# Patient Record
Sex: FEMALE | Race: WHITE | NOT HISPANIC OR LATINO | Employment: OTHER | URBAN - METROPOLITAN AREA
[De-identification: names, ages, dates, MRNs, and addresses within clinical notes are randomized per-mention and may not be internally consistent; named-entity substitution may affect disease eponyms.]

---

## 2023-03-02 LAB
ANION GAP IN SER/PLAS: 14 MMOL/L (ref 10–20)
CALCIUM (MG/DL) IN SER/PLAS: 9.9 MG/DL (ref 8.6–10.6)
CARBON DIOXIDE, TOTAL (MMOL/L) IN SER/PLAS: 28 MMOL/L (ref 21–32)
CHLORIDE (MMOL/L) IN SER/PLAS: 104 MMOL/L (ref 98–107)
CHOLESTEROL (MG/DL) IN SER/PLAS: 176 MG/DL (ref 0–199)
CHOLESTEROL IN HDL (MG/DL) IN SER/PLAS: 93.5 MG/DL
CHOLESTEROL/HDL RATIO: 1.9
CREATININE (MG/DL) IN SER/PLAS: 0.66 MG/DL (ref 0.5–1.05)
ESTIMATED AVERAGE GLUCOSE FOR HBA1C: 114 MG/DL
GFR FEMALE: >90 ML/MIN/1.73M2
GLUCOSE (MG/DL) IN SER/PLAS: 102 MG/DL (ref 74–99)
HEMOGLOBIN A1C/HEMOGLOBIN TOTAL IN BLOOD: 5.6 %
LDL: 69 MG/DL (ref 0–99)
POTASSIUM (MMOL/L) IN SER/PLAS: 4.8 MMOL/L (ref 3.5–5.3)
SODIUM (MMOL/L) IN SER/PLAS: 141 MMOL/L (ref 136–145)
TRIGLYCERIDE (MG/DL) IN SER/PLAS: 67 MG/DL (ref 0–149)
UREA NITROGEN (MG/DL) IN SER/PLAS: 19 MG/DL (ref 6–23)
VLDL: 13 MG/DL (ref 0–40)

## 2023-05-17 LAB
CLARITY FLUID: NORMAL
COLOR OF BODY FLUID: NORMAL
ERYTHROCYTES (/UL) IN BODY FLUID: NORMAL /UL
JOINT FLUID CRYSTALS: NORMAL
LEUKOCYTES (/UL) IN BODY FLUID: 106 /UL

## 2023-05-20 LAB
GRAM STAIN: NORMAL
STERILE FLUID CULTURE/SMEAR: NORMAL

## 2023-06-06 ENCOUNTER — APPOINTMENT (OUTPATIENT)
Dept: PRIMARY CARE | Facility: CLINIC | Age: 68
End: 2023-06-06
Payer: COMMERCIAL

## 2023-06-08 ENCOUNTER — OFFICE VISIT (OUTPATIENT)
Dept: PRIMARY CARE | Facility: CLINIC | Age: 68
End: 2023-06-08
Payer: MEDICARE

## 2023-06-08 VITALS
TEMPERATURE: 96.5 F | WEIGHT: 198 LBS | BODY MASS INDEX: 31.08 KG/M2 | OXYGEN SATURATION: 99 % | HEART RATE: 80 BPM | DIASTOLIC BLOOD PRESSURE: 68 MMHG | SYSTOLIC BLOOD PRESSURE: 124 MMHG | HEIGHT: 67 IN

## 2023-06-08 DIAGNOSIS — Z00.00 ROUTINE GENERAL MEDICAL EXAMINATION AT HEALTH CARE FACILITY: Primary | ICD-10-CM

## 2023-06-08 DIAGNOSIS — Z00.00 ROUTINE GENERAL MEDICAL EXAMINATION AT A HEALTH CARE FACILITY: ICD-10-CM

## 2023-06-08 DIAGNOSIS — E11.9 TYPE 2 DIABETES MELLITUS WITHOUT COMPLICATION, UNSPECIFIED WHETHER LONG TERM INSULIN USE (MULTI): ICD-10-CM

## 2023-06-08 DIAGNOSIS — E78.5 HYPERLIPIDEMIA, UNSPECIFIED HYPERLIPIDEMIA TYPE: ICD-10-CM

## 2023-06-08 DIAGNOSIS — I10 HYPERTENSION, UNSPECIFIED TYPE: ICD-10-CM

## 2023-06-08 LAB — HBA1C MFR BLD: 5.7 % (ref 4.2–6.5)

## 2023-06-08 PROCEDURE — 1160F RVW MEDS BY RX/DR IN RCRD: CPT | Performed by: STUDENT IN AN ORGANIZED HEALTH CARE EDUCATION/TRAINING PROGRAM

## 2023-06-08 PROCEDURE — 3074F SYST BP LT 130 MM HG: CPT | Performed by: STUDENT IN AN ORGANIZED HEALTH CARE EDUCATION/TRAINING PROGRAM

## 2023-06-08 PROCEDURE — 4010F ACE/ARB THERAPY RXD/TAKEN: CPT | Performed by: STUDENT IN AN ORGANIZED HEALTH CARE EDUCATION/TRAINING PROGRAM

## 2023-06-08 PROCEDURE — 99213 OFFICE O/P EST LOW 20 MIN: CPT | Performed by: STUDENT IN AN ORGANIZED HEALTH CARE EDUCATION/TRAINING PROGRAM

## 2023-06-08 PROCEDURE — 1036F TOBACCO NON-USER: CPT | Performed by: STUDENT IN AN ORGANIZED HEALTH CARE EDUCATION/TRAINING PROGRAM

## 2023-06-08 PROCEDURE — G0439 PPPS, SUBSEQ VISIT: HCPCS | Performed by: STUDENT IN AN ORGANIZED HEALTH CARE EDUCATION/TRAINING PROGRAM

## 2023-06-08 PROCEDURE — 83036 HEMOGLOBIN GLYCOSYLATED A1C: CPT | Performed by: STUDENT IN AN ORGANIZED HEALTH CARE EDUCATION/TRAINING PROGRAM

## 2023-06-08 PROCEDURE — 1170F FXNL STATUS ASSESSED: CPT | Performed by: STUDENT IN AN ORGANIZED HEALTH CARE EDUCATION/TRAINING PROGRAM

## 2023-06-08 PROCEDURE — 3078F DIAST BP <80 MM HG: CPT | Performed by: STUDENT IN AN ORGANIZED HEALTH CARE EDUCATION/TRAINING PROGRAM

## 2023-06-08 PROCEDURE — 1159F MED LIST DOCD IN RCRD: CPT | Performed by: STUDENT IN AN ORGANIZED HEALTH CARE EDUCATION/TRAINING PROGRAM

## 2023-06-08 PROCEDURE — 3044F HG A1C LEVEL LT 7.0%: CPT | Performed by: STUDENT IN AN ORGANIZED HEALTH CARE EDUCATION/TRAINING PROGRAM

## 2023-06-08 RX ORDER — VIT C/E/ZN/COPPR/LUTEIN/ZEAXAN 250MG-90MG
1 CAPSULE ORAL DAILY
COMMUNITY
End: 2023-10-22

## 2023-06-08 RX ORDER — VIT C/E/ZN/COPPR/LUTEIN/ZEAXAN 250MG-90MG
25 CAPSULE ORAL DAILY
COMMUNITY
End: 2023-10-22

## 2023-06-08 RX ORDER — ATORVASTATIN CALCIUM 20 MG/1
20 TABLET, FILM COATED ORAL DAILY
COMMUNITY
Start: 2023-04-25 | End: 2023-11-21

## 2023-06-08 RX ORDER — LISINOPRIL 20 MG/1
20 TABLET ORAL DAILY
COMMUNITY
End: 2024-01-02 | Stop reason: SDUPTHER

## 2023-06-08 RX ORDER — ASPIRIN 81 MG/1
81 TABLET ORAL DAILY
COMMUNITY

## 2023-06-08 RX ORDER — GLIMEPIRIDE 1 MG/1
1 TABLET ORAL 2 TIMES DAILY
COMMUNITY
End: 2023-08-23

## 2023-06-08 RX ORDER — KETOCONAZOLE 20 MG/G
CREAM TOPICAL
COMMUNITY
Start: 2023-04-24 | End: 2024-05-15 | Stop reason: SDUPTHER

## 2023-06-08 ASSESSMENT — PATIENT HEALTH QUESTIONNAIRE - PHQ9
1. LITTLE INTEREST OR PLEASURE IN DOING THINGS: NOT AT ALL
2. FEELING DOWN, DEPRESSED OR HOPELESS: NOT AT ALL
SUM OF ALL RESPONSES TO PHQ9 QUESTIONS 1 AND 2: 0

## 2023-06-08 ASSESSMENT — ACTIVITIES OF DAILY LIVING (ADL)
GROCERY_SHOPPING: INDEPENDENT
TAKING_MEDICATION: INDEPENDENT
DRESSING: INDEPENDENT
DOING_HOUSEWORK: INDEPENDENT
MANAGING_FINANCES: INDEPENDENT
BATHING: INDEPENDENT

## 2023-06-08 ASSESSMENT — ENCOUNTER SYMPTOMS
LOSS OF SENSATION IN FEET: 0
DEPRESSION: 0
OCCASIONAL FEELINGS OF UNSTEADINESS: 0

## 2023-06-08 NOTE — PROGRESS NOTES
"Subjective   Reason for Visit: Carmen Mendez is an 68 y.o. female here for a Medicare Wellness visit.     HPI    DM: well controlled, lost about 50 lbs was at 12 in past and now in 5.7. only on amaryl, takes 1 mg BID. Has some neuropathy, and eye issues but sees an eye doctor    Htn: stable  on ac no lh or dizziness    Hld: stable      Patient Care Team:  Hardeep Green DO as PCP - General (Internal Medicine)     Review of Systems   All other systems reviewed and are negative.      Objective   Vitals:  /68 (BP Location: Right arm, Patient Position: Sitting)   Pulse 80   Temp 35.8 °C (96.5 °F) (Temporal)   Ht 1.702 m (5' 7\")   Wt 89.8 kg (198 lb)   SpO2 99%   BMI 31.01 kg/m²       Physical Exam  Constitutional:       Appearance: Normal appearance.   HENT:      Head: Normocephalic and atraumatic.      Right Ear: Tympanic membrane and ear canal normal.      Left Ear: Tympanic membrane and ear canal normal.      Mouth/Throat:      Mouth: Mucous membranes are moist.      Pharynx: Oropharynx is clear.   Eyes:      Extraocular Movements: Extraocular movements intact.      Conjunctiva/sclera: Conjunctivae normal.      Pupils: Pupils are equal, round, and reactive to light.   Cardiovascular:      Rate and Rhythm: Normal rate and regular rhythm.      Pulses: Normal pulses.      Heart sounds: Normal heart sounds.   Pulmonary:      Effort: Pulmonary effort is normal.      Breath sounds: Normal breath sounds.   Abdominal:      General: Abdomen is flat. Bowel sounds are normal.      Palpations: Abdomen is soft.   Musculoskeletal:         General: Normal range of motion.      Cervical back: Normal range of motion and neck supple.   Skin:     General: Skin is warm and dry.      Capillary Refill: Capillary refill takes 2 to 3 seconds.   Neurological:      General: No focal deficit present.      Mental Status: She is alert and oriented to person, place, and time. Mental status is at baseline.   Psychiatric:         Mood and " Affect: Mood normal.         Behavior: Behavior normal.         Thought Content: Thought content normal.         Judgment: Judgment normal.         Assessment/Plan   Problem List Items Addressed This Visit    None  Visit Diagnoses       Type 2 diabetes mellitus without complication, unspecified whether long term insulin use (CMS/[facility])        Relevant Orders    POCT Glycosylated Hemoglobin (HGB A1C) docked device            1. Type 2 diabetes mellitus without complication, unspecified whether long term insulin use (CMS/[facility])  - 5.7 doing well reduce amaryl to once a day  - POCT Glycosylated Hemoglobin (HGB A1C) docked device    2. Routine general medical examination at a health care facility  Stable labs reviewed    3. Hypertension, unspecified type  stable    4. Hyperlipidemia, unspecified hyperlipidemia type  Continue statin

## 2023-08-23 DIAGNOSIS — E11.9 TYPE 2 DIABETES MELLITUS WITHOUT COMPLICATION, WITHOUT LONG-TERM CURRENT USE OF INSULIN (MULTI): Primary | ICD-10-CM

## 2023-08-23 RX ORDER — GLIMEPIRIDE 1 MG/1
3 TABLET ORAL DAILY
Qty: 270 TABLET | Refills: 2 | Status: SHIPPED | OUTPATIENT
Start: 2023-08-23 | End: 2023-09-08

## 2023-09-08 ENCOUNTER — OFFICE VISIT (OUTPATIENT)
Dept: PRIMARY CARE | Facility: CLINIC | Age: 68
End: 2023-09-08
Payer: MEDICARE

## 2023-09-08 VITALS
HEART RATE: 76 BPM | WEIGHT: 200 LBS | OXYGEN SATURATION: 98 % | DIASTOLIC BLOOD PRESSURE: 70 MMHG | BODY MASS INDEX: 31.32 KG/M2 | SYSTOLIC BLOOD PRESSURE: 124 MMHG

## 2023-09-08 DIAGNOSIS — Z12.31 ENCOUNTER FOR SCREENING MAMMOGRAM FOR MALIGNANT NEOPLASM OF BREAST: ICD-10-CM

## 2023-09-08 DIAGNOSIS — E11.9 TYPE 2 DIABETES MELLITUS WITHOUT COMPLICATION, UNSPECIFIED WHETHER LONG TERM INSULIN USE (MULTI): ICD-10-CM

## 2023-09-08 DIAGNOSIS — Z12.39 BREAST SCREENING: Primary | ICD-10-CM

## 2023-09-08 LAB — HBA1C MFR BLD: 6 % (ref 4.2–6.5)

## 2023-09-08 PROCEDURE — 1160F RVW MEDS BY RX/DR IN RCRD: CPT | Performed by: STUDENT IN AN ORGANIZED HEALTH CARE EDUCATION/TRAINING PROGRAM

## 2023-09-08 PROCEDURE — 83036 HEMOGLOBIN GLYCOSYLATED A1C: CPT | Mod: CLIA WAIVED TEST | Performed by: STUDENT IN AN ORGANIZED HEALTH CARE EDUCATION/TRAINING PROGRAM

## 2023-09-08 PROCEDURE — 3078F DIAST BP <80 MM HG: CPT | Performed by: STUDENT IN AN ORGANIZED HEALTH CARE EDUCATION/TRAINING PROGRAM

## 2023-09-08 PROCEDURE — 3044F HG A1C LEVEL LT 7.0%: CPT | Performed by: STUDENT IN AN ORGANIZED HEALTH CARE EDUCATION/TRAINING PROGRAM

## 2023-09-08 PROCEDURE — 1036F TOBACCO NON-USER: CPT | Performed by: STUDENT IN AN ORGANIZED HEALTH CARE EDUCATION/TRAINING PROGRAM

## 2023-09-08 PROCEDURE — 4010F ACE/ARB THERAPY RXD/TAKEN: CPT | Performed by: STUDENT IN AN ORGANIZED HEALTH CARE EDUCATION/TRAINING PROGRAM

## 2023-09-08 PROCEDURE — 99214 OFFICE O/P EST MOD 30 MIN: CPT | Performed by: STUDENT IN AN ORGANIZED HEALTH CARE EDUCATION/TRAINING PROGRAM

## 2023-09-08 PROCEDURE — 3074F SYST BP LT 130 MM HG: CPT | Performed by: STUDENT IN AN ORGANIZED HEALTH CARE EDUCATION/TRAINING PROGRAM

## 2023-09-08 PROCEDURE — 1159F MED LIST DOCD IN RCRD: CPT | Performed by: STUDENT IN AN ORGANIZED HEALTH CARE EDUCATION/TRAINING PROGRAM

## 2023-09-08 ASSESSMENT — ENCOUNTER SYMPTOMS: DEPRESSION: 0

## 2023-09-08 NOTE — PROGRESS NOTES
Subjective   Patient ID: Carmen Mendez is a 68 y.o. female who presents for Follow-up (A1C check and was in er for chest pain.).    HPI     Patient was in the ER for chest pain.  Does follow with Dr. Carreno.  She has scheduled for stress test.  Her pain is gone resolved.    A1c check.  Patient's been on 1 mg of Amaryl.  Patient's A1c today was 6.0.  Went up by three-point at the reduced dose.  Stop her and altogether.  She will follow-up in 3 months to follow-up with this.    She is a very knee condition with synovial tumors.  This can cause traumatic fractures of her knee.  She does follow with orthopedic surgery.  She will need to have definitive surgery at the time.  But she is not ready for at this time.  She was advised at this time.    Review of Systems   All other systems reviewed and are negative.      Objective   /70 (BP Location: Right arm, Patient Position: Sitting, BP Cuff Size: Adult)   Pulse 76   Wt 90.7 kg (200 lb)   SpO2 98%   BMI 31.32 kg/m²     Physical Exam  Constitutional:       Appearance: Normal appearance.   HENT:      Head: Normocephalic and atraumatic.      Right Ear: Tympanic membrane and ear canal normal.      Left Ear: Tympanic membrane and ear canal normal.      Mouth/Throat:      Mouth: Mucous membranes are moist.      Pharynx: Oropharynx is clear.   Eyes:      Extraocular Movements: Extraocular movements intact.      Conjunctiva/sclera: Conjunctivae normal.      Pupils: Pupils are equal, round, and reactive to light.   Cardiovascular:      Rate and Rhythm: Normal rate and regular rhythm.      Pulses: Normal pulses.      Heart sounds: Normal heart sounds.   Pulmonary:      Effort: Pulmonary effort is normal.      Breath sounds: Normal breath sounds.   Abdominal:      General: Abdomen is flat. Bowel sounds are normal.      Palpations: Abdomen is soft.   Musculoskeletal:         General: Normal range of motion.      Cervical back: Normal range of motion and neck supple.   Skin:      General: Skin is warm and dry.      Capillary Refill: Capillary refill takes 2 to 3 seconds.   Neurological:      General: No focal deficit present.      Mental Status: She is alert and oriented to person, place, and time. Mental status is at baseline.   Psychiatric:         Mood and Affect: Mood normal.         Behavior: Behavior normal.         Thought Content: Thought content normal.         Judgment: Judgment normal.         Assessment/Plan   1. Type 2 diabetes mellitus without complication, unspecified whether long term insulin use (CMS/[facility])  A1c 6.0 we will stop her Amaryl per patient request.  We will follow her on her diet controlled diabetes.  She will follow-up in 3 months.  - POCT Glycosylated Hemoglobin (HGB A1C) docked device      Chest pain.  Her ER visit was reviewed.  Stable and stress test.     Vies get her knee surgery done.

## 2023-09-25 PROBLEM — R07.89 CHEST PAIN, MIDSTERNAL: Status: ACTIVE | Noted: 2023-09-25

## 2023-09-25 PROBLEM — H35.3220 EXUDATIVE AGE-RELATED MACULAR DEGENERATION OF LEFT EYE (MULTI): Status: ACTIVE | Noted: 2023-09-25

## 2023-09-25 PROBLEM — I25.10 CORONARY ARTERY DISEASE: Status: ACTIVE | Noted: 2023-09-25

## 2023-09-25 PROBLEM — Z85.828 PERSONAL HISTORY OF OTHER MALIGNANT NEOPLASM OF SKIN: Status: ACTIVE | Noted: 2023-02-23

## 2023-09-25 PROBLEM — E11.42 PERIPHERAL SENSORY NEUROPATHY DUE TO TYPE 2 DIABETES MELLITUS (MULTI): Status: ACTIVE | Noted: 2023-09-25

## 2023-09-25 PROBLEM — R91.1 LUNG NODULE: Status: ACTIVE | Noted: 2023-09-25

## 2023-09-25 PROBLEM — M12.812 ROTATOR CUFF ARTHROPATHY OF LEFT SHOULDER: Status: ACTIVE | Noted: 2023-07-03

## 2023-09-25 PROBLEM — L21.9 SEBORRHEIC DERMATITIS, UNSPECIFIED: Status: ACTIVE | Noted: 2023-02-23

## 2023-09-25 PROBLEM — K64.4 HEMORRHOIDS, EXTERNAL: Status: ACTIVE | Noted: 2023-09-25

## 2023-09-25 PROBLEM — D18.01 HEMANGIOMA OF SKIN AND SUBCUTANEOUS TISSUE: Status: ACTIVE | Noted: 2023-02-23

## 2023-09-25 PROBLEM — E28.39 MENOPAUSE OVARIAN FAILURE: Status: ACTIVE | Noted: 2023-09-25

## 2023-09-25 PROBLEM — D23.61 OTHER BENIGN NEOPLASM OF SKIN OF RIGHT UPPER LIMB, INCLUDING SHOULDER: Status: ACTIVE | Noted: 2023-02-23

## 2023-09-25 PROBLEM — L65.0 TELOGEN EFFLUVIUM: Status: ACTIVE | Noted: 2023-02-23

## 2023-09-25 PROBLEM — L65.9 NONSCARRING HAIR LOSS, UNSPECIFIED: Status: ACTIVE | Noted: 2023-02-23

## 2023-09-25 PROBLEM — G56.22 CUBITAL TUNNEL SYNDROME ON LEFT: Status: ACTIVE | Noted: 2023-09-25

## 2023-09-25 PROBLEM — D49.2 NEOPLASM OF UNSPECIFIED BEHAVIOR OF BONE, SOFT TISSUE, AND SKIN: Status: ACTIVE | Noted: 2023-02-23

## 2023-09-25 PROBLEM — E11.40 TYPE 2 DIABETES MELLITUS WITH DIABETIC NEUROPATHY, UNSPECIFIED (MULTI): Status: ACTIVE | Noted: 2019-09-30

## 2023-09-25 PROBLEM — E08.3299 BACKGROUND DIABETIC RETINOPATHY ASSOCIATED WITH DIABETES MELLITUS DUE TO UNDERLYING CONDITION (MULTI): Status: ACTIVE | Noted: 2023-09-25

## 2023-09-25 PROBLEM — L57.0 SOLAR KERATOSIS: Status: ACTIVE | Noted: 2023-02-23

## 2023-09-25 PROBLEM — E78.2 MIXED HYPERLIPIDEMIA: Status: ACTIVE | Noted: 2023-06-08

## 2023-09-25 PROBLEM — G57.90 NEUROPATHY OF FOOT: Status: ACTIVE | Noted: 2023-09-25

## 2023-09-25 PROBLEM — M17.9 OSTEOARTHRITIS OF KNEE: Status: ACTIVE | Noted: 2023-09-25

## 2023-09-25 PROBLEM — L81.4 OTHER MELANIN HYPERPIGMENTATION: Status: ACTIVE | Noted: 2023-02-23

## 2023-09-25 PROBLEM — M65.312 TRIGGER THUMB OF LEFT HAND: Status: ACTIVE | Noted: 2023-09-25

## 2023-09-25 PROBLEM — M25.622 DECREASED RANGE OF MOTION OF LEFT ELBOW: Status: ACTIVE | Noted: 2023-09-25

## 2023-09-25 PROBLEM — M25.612 DECREASED RANGE OF MOTION OF LEFT SHOULDER: Status: ACTIVE | Noted: 2023-09-25

## 2023-09-25 PROBLEM — L85.3 XEROSIS CUTIS: Status: ACTIVE | Noted: 2023-02-23

## 2023-09-25 PROBLEM — M75.02 ADHESIVE CAPSULITIS OF LEFT SHOULDER: Status: ACTIVE | Noted: 2023-07-03

## 2023-09-25 PROBLEM — D22.5 MELANOCYTIC NEVI OF TRUNK: Status: ACTIVE | Noted: 2023-02-23

## 2023-09-25 PROBLEM — G56.02 LEFT CARPAL TUNNEL SYNDROME: Status: ACTIVE | Noted: 2023-09-25

## 2023-09-25 PROBLEM — D22.9 MULTIPLE NEVI: Status: ACTIVE | Noted: 2023-09-25

## 2023-09-26 ENCOUNTER — APPOINTMENT (OUTPATIENT)
Dept: PRIMARY CARE | Facility: CLINIC | Age: 68
End: 2023-09-26
Payer: MEDICARE

## 2023-09-26 DIAGNOSIS — Z23 NEED FOR VACCINATION: ICD-10-CM

## 2023-10-06 ENCOUNTER — OFFICE VISIT (OUTPATIENT)
Dept: PRIMARY CARE | Facility: CLINIC | Age: 68
End: 2023-10-06
Payer: MEDICARE

## 2023-10-06 VITALS
HEART RATE: 92 BPM | WEIGHT: 200 LBS | HEIGHT: 67 IN | SYSTOLIC BLOOD PRESSURE: 130 MMHG | DIASTOLIC BLOOD PRESSURE: 78 MMHG | BODY MASS INDEX: 31.39 KG/M2 | OXYGEN SATURATION: 99 %

## 2023-10-06 DIAGNOSIS — T80.90XS INJECTION SITE REACTION, SEQUELA: Primary | ICD-10-CM

## 2023-10-06 PROBLEM — T80.90XA INJECTION SITE REACTION: Status: ACTIVE | Noted: 2023-10-06

## 2023-10-06 PROCEDURE — 99213 OFFICE O/P EST LOW 20 MIN: CPT | Performed by: STUDENT IN AN ORGANIZED HEALTH CARE EDUCATION/TRAINING PROGRAM

## 2023-10-06 PROCEDURE — 1159F MED LIST DOCD IN RCRD: CPT | Performed by: STUDENT IN AN ORGANIZED HEALTH CARE EDUCATION/TRAINING PROGRAM

## 2023-10-06 PROCEDURE — 3075F SYST BP GE 130 - 139MM HG: CPT | Performed by: STUDENT IN AN ORGANIZED HEALTH CARE EDUCATION/TRAINING PROGRAM

## 2023-10-06 PROCEDURE — 3044F HG A1C LEVEL LT 7.0%: CPT | Performed by: STUDENT IN AN ORGANIZED HEALTH CARE EDUCATION/TRAINING PROGRAM

## 2023-10-06 PROCEDURE — 1160F RVW MEDS BY RX/DR IN RCRD: CPT | Performed by: STUDENT IN AN ORGANIZED HEALTH CARE EDUCATION/TRAINING PROGRAM

## 2023-10-06 PROCEDURE — 3078F DIAST BP <80 MM HG: CPT | Performed by: STUDENT IN AN ORGANIZED HEALTH CARE EDUCATION/TRAINING PROGRAM

## 2023-10-06 PROCEDURE — 1036F TOBACCO NON-USER: CPT | Performed by: STUDENT IN AN ORGANIZED HEALTH CARE EDUCATION/TRAINING PROGRAM

## 2023-10-06 PROCEDURE — 4010F ACE/ARB THERAPY RXD/TAKEN: CPT | Performed by: STUDENT IN AN ORGANIZED HEALTH CARE EDUCATION/TRAINING PROGRAM

## 2023-10-06 NOTE — PROGRESS NOTES
"Subjective   Patient ID: Carmen Mendez is a 68 y.o. female who presents for Allergic Reaction (Pain and difficulty moving the right arm, warmth and redness above the vaccine site).    HPI     Flu shot last week startd to have arm pain near injection site yesterday, hurt to move around, getting warren,r no redeness, swellign or mass noted    Review of Systems   All other systems reviewed and are negative.      Objective   /78 (BP Location: Left arm, Patient Position: Sitting)   Pulse 92   Ht 1.702 m (5' 7\")   Wt 90.7 kg (200 lb)   SpO2 99%   BMI 31.32 kg/m²     Physical Exam  Constitutional:       Appearance: Normal appearance.   HENT:      Head: Normocephalic and atraumatic.      Right Ear: Tympanic membrane and ear canal normal.      Left Ear: Tympanic membrane and ear canal normal.      Mouth/Throat:      Mouth: Mucous membranes are moist.      Pharynx: Oropharynx is clear.   Eyes:      Extraocular Movements: Extraocular movements intact.      Conjunctiva/sclera: Conjunctivae normal.      Pupils: Pupils are equal, round, and reactive to light.   Cardiovascular:      Rate and Rhythm: Normal rate and regular rhythm.      Pulses: Normal pulses.      Heart sounds: Normal heart sounds.   Pulmonary:      Effort: Pulmonary effort is normal.      Breath sounds: Normal breath sounds.   Abdominal:      General: Abdomen is flat. Bowel sounds are normal.      Palpations: Abdomen is soft.   Musculoskeletal:         General: Normal range of motion.      Cervical back: Normal range of motion and neck supple.   Skin:     General: Skin is warm and dry.      Capillary Refill: Capillary refill takes 2 to 3 seconds.   Neurological:      General: No focal deficit present.      Mental Status: She is alert and oriented to person, place, and time. Mental status is at baseline.   Psychiatric:         Mood and Affect: Mood normal.         Behavior: Behavior normal.         Thought Content: Thought content normal.         " Judgment: Judgment normal.         Assessment/Plan   1. Injection site reaction, sequela  Reassurance  No redness mass, abscess or hematoma noted

## 2023-10-22 PROBLEM — Z87.891 PERSONAL HISTORY OF NICOTINE DEPENDENCE: Status: ACTIVE | Noted: 2021-08-25

## 2023-10-22 PROBLEM — M62.81 MUSCLE WEAKNESS (GENERALIZED): Status: ACTIVE | Noted: 2019-10-01

## 2023-10-22 PROBLEM — Z96.653 PRESENCE OF ARTIFICIAL KNEE JOINT, BILATERAL: Status: ACTIVE | Noted: 2019-09-30

## 2023-10-22 PROBLEM — I10 ESSENTIAL (PRIMARY) HYPERTENSION: Status: ACTIVE | Noted: 2019-09-30

## 2023-10-22 PROBLEM — H40.9 UNSPECIFIED GLAUCOMA: Status: ACTIVE | Noted: 2019-09-30

## 2023-10-22 PROBLEM — Z96.612 PRESENCE OF LEFT ARTIFICIAL SHOULDER JOINT: Status: ACTIVE | Noted: 2019-09-30

## 2023-10-24 ENCOUNTER — ANCILLARY PROCEDURE (OUTPATIENT)
Dept: RADIOLOGY | Facility: CLINIC | Age: 68
End: 2023-10-24
Payer: MEDICARE

## 2023-10-24 ENCOUNTER — HOSPITAL ENCOUNTER (OUTPATIENT)
Dept: CARDIOLOGY | Facility: CLINIC | Age: 68
Discharge: HOME | End: 2023-10-24
Payer: MEDICARE

## 2023-10-24 ENCOUNTER — OFFICE VISIT (OUTPATIENT)
Dept: CARDIOLOGY | Facility: CLINIC | Age: 68
End: 2023-10-24
Payer: MEDICARE

## 2023-10-24 VITALS
SYSTOLIC BLOOD PRESSURE: 122 MMHG | HEART RATE: 66 BPM | HEIGHT: 67 IN | BODY MASS INDEX: 31.86 KG/M2 | DIASTOLIC BLOOD PRESSURE: 76 MMHG | WEIGHT: 203 LBS

## 2023-10-24 DIAGNOSIS — E78.2 MIXED HYPERLIPIDEMIA: ICD-10-CM

## 2023-10-24 DIAGNOSIS — I25.10 ATHEROSCLEROTIC HEART DISEASE OF NATIVE CORONARY ARTERY WITHOUT ANGINA PECTORIS: ICD-10-CM

## 2023-10-24 DIAGNOSIS — I25.119 CORONARY ARTERY DISEASE INVOLVING NATIVE CORONARY ARTERY OF NATIVE HEART WITH ANGINA PECTORIS (CMS-HCC): ICD-10-CM

## 2023-10-24 DIAGNOSIS — R07.89 OTHER CHEST PAIN: Primary | ICD-10-CM

## 2023-10-24 DIAGNOSIS — I10 ESSENTIAL (PRIMARY) HYPERTENSION: ICD-10-CM

## 2023-10-24 DIAGNOSIS — R07.9 CHEST PAIN, UNSPECIFIED: ICD-10-CM

## 2023-10-24 DIAGNOSIS — I25.10 CAD (CORONARY ARTERY DISEASE): Primary | ICD-10-CM

## 2023-10-24 DIAGNOSIS — R07.89 OTHER CHEST PAIN: ICD-10-CM

## 2023-10-24 DIAGNOSIS — R07.89 CHEST PAIN, MIDSTERNAL: Primary | ICD-10-CM

## 2023-10-24 PROCEDURE — 1159F MED LIST DOCD IN RCRD: CPT | Performed by: INTERNAL MEDICINE

## 2023-10-24 PROCEDURE — 3044F HG A1C LEVEL LT 7.0%: CPT | Performed by: INTERNAL MEDICINE

## 2023-10-24 PROCEDURE — 93017 CV STRESS TEST TRACING ONLY: CPT

## 2023-10-24 PROCEDURE — 78451 HT MUSCLE IMAGE SPECT SING: CPT

## 2023-10-24 PROCEDURE — 3074F SYST BP LT 130 MM HG: CPT | Performed by: INTERNAL MEDICINE

## 2023-10-24 PROCEDURE — 1160F RVW MEDS BY RX/DR IN RCRD: CPT | Performed by: INTERNAL MEDICINE

## 2023-10-24 PROCEDURE — 4010F ACE/ARB THERAPY RXD/TAKEN: CPT | Performed by: INTERNAL MEDICINE

## 2023-10-24 PROCEDURE — 78452 HT MUSCLE IMAGE SPECT MULT: CPT | Performed by: INTERNAL MEDICINE

## 2023-10-24 PROCEDURE — 93016 CV STRESS TEST SUPVJ ONLY: CPT | Performed by: INTERNAL MEDICINE

## 2023-10-24 PROCEDURE — 99214 OFFICE O/P EST MOD 30 MIN: CPT | Performed by: INTERNAL MEDICINE

## 2023-10-24 PROCEDURE — 3078F DIAST BP <80 MM HG: CPT | Performed by: INTERNAL MEDICINE

## 2023-10-24 PROCEDURE — 1036F TOBACCO NON-USER: CPT | Performed by: INTERNAL MEDICINE

## 2023-10-24 RX ORDER — GLIMEPIRIDE 1 MG/1
1 TABLET ORAL
COMMUNITY
End: 2024-05-28 | Stop reason: SDUPTHER

## 2023-10-24 RX ORDER — AMOXICILLIN 500 MG/1
500 TABLET, FILM COATED ORAL
COMMUNITY
Start: 2023-10-02 | End: 2023-10-24 | Stop reason: ALTCHOICE

## 2023-10-24 NOTE — PROGRESS NOTES
Subjective   Carmen Mendez is a 68 y.o. female.    Chief Complaint:  Chest pain    HPI    The patient was seen in the emergency room in 2023 when she presented with chest pain.  She described midsternal chest pain described as a sharp rather severe discomfort.  This lasted for hours.  She presented to the emergency room.  Her troponins were negative.  Her EKGs showed no acute changes.  She was discharged for outpatient follow-up.  She is here for follow-up of her stress testing and thallium imaging studies.  She has had no further episodes of chest discomfort or chest pressure since discharge.  Otherwise since her last visit she has felt well and has had no other problems.  No problems with her medications.    Her diagnosis of coronary artery disease is based on calcifications in the mid left anterior descending coronary artery seen on a prior CT scan of the chest. There is moderate atherosclerosis present.    Her cardiac history is significant for hypertension. Risk factors for coronary artery disease include hypertension, 27 years of diabetes, a 10-pack-year smoking history, positive family history of vascular disease, and a history of hyperlipidemia.    Past surgical history consistent with a history of bilateral total knee replacements,  section, carpal tunnel, and cataract surgeries. Also has had left arm surgery and right shoulder surgery.    She is  with 3 children. 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     Review of Systems   Cardiovascular:  Positive for chest pain.   All other systems reviewed and are negative.      Objective   Constitutional:       Appearance: Not in distress.   Neck:      Vascular: JVD normal.   Pulmonary:      Breath sounds: Normal breath sounds.   Cardiovascular:      Normal rate. Regular rhythm. Normal S1. Normal S2.       Murmurs: There is no murmur.      No gallop.    Pulses:     Intact distal pulses.   Edema:     Peripheral edema  "absent.   Abdominal:      General: There is no distension.      Palpations: Abdomen is soft.   Neurological:      Mental Status: Alert.         Visit Vitals  /76   Pulse 66   Ht 1.702 m (5' 7\")   Wt 92.1 kg (203 lb)   BMI 31.79 kg/m²   OB Status Postmenopausal   Smoking Status Former   BSA 2.09 m²        Lab Review:   Lab Results   Component Value Date     08/02/2023    K 4.3 08/02/2023     08/02/2023    CO2 24 08/02/2023    BUN 16 08/02/2023    CREATININE 0.68 08/02/2023    GLUCOSE 147 (H) 08/02/2023    CALCIUM 9.7 08/02/2023       Assessment:    1.  Coronary artery disease.  Today's stress thallium study shows no ischemia with normal left ventricular function.  We personally reviewed the nuclear stress imaging studies.  The EKGs demonstrate no acute changes.  Because the circumflex and right coronary arteries have no significant atherosclerosis and the atherosclerosis is limited to the left anterior descending coronary artery, this is going to be a very accurate study.  At this point we can therefore continue medical management.    2.  Hypertension.  Blood pressures are normal.    3.  Hyperlipidemia.  Cholesterol 176, HDL 93, LDL 69.  "

## 2023-11-13 ENCOUNTER — LAB (OUTPATIENT)
Dept: LAB | Facility: LAB | Age: 68
End: 2023-11-13
Payer: MEDICARE

## 2023-11-13 DIAGNOSIS — M25.562 PAIN IN LEFT KNEE: Primary | ICD-10-CM

## 2023-11-13 LAB
BASOPHILS # BLD AUTO: 0.02 X10*3/UL (ref 0–0.1)
BASOPHILS NFR BLD AUTO: 0.3 %
CRP SERPL-MCNC: 0.11 MG/DL
EOSINOPHIL # BLD AUTO: 0.11 X10*3/UL (ref 0–0.7)
EOSINOPHIL NFR BLD AUTO: 1.6 %
ERYTHROCYTE [DISTWIDTH] IN BLOOD BY AUTOMATED COUNT: 13.4 % (ref 11.5–14.5)
ERYTHROCYTE [SEDIMENTATION RATE] IN BLOOD BY WESTERGREN METHOD: 12 MM/H (ref 0–30)
HCT VFR BLD AUTO: 44.5 % (ref 36–46)
HGB BLD-MCNC: 14.8 G/DL (ref 12–16)
IMM GRANULOCYTES # BLD AUTO: 0.01 X10*3/UL (ref 0–0.7)
IMM GRANULOCYTES NFR BLD AUTO: 0.1 % (ref 0–0.9)
LYMPHOCYTES # BLD AUTO: 1.87 X10*3/UL (ref 1.2–4.8)
LYMPHOCYTES NFR BLD AUTO: 26.6 %
MCH RBC QN AUTO: 29.8 PG (ref 26–34)
MCHC RBC AUTO-ENTMCNC: 33.3 G/DL (ref 32–36)
MCV RBC AUTO: 90 FL (ref 80–100)
MONOCYTES # BLD AUTO: 0.38 X10*3/UL (ref 0.1–1)
MONOCYTES NFR BLD AUTO: 5.4 %
NEUTROPHILS # BLD AUTO: 4.63 X10*3/UL (ref 1.2–7.7)
NEUTROPHILS NFR BLD AUTO: 66 %
NRBC BLD-RTO: 0 /100 WBCS (ref 0–0)
PLATELET # BLD AUTO: 262 X10*3/UL (ref 150–450)
RBC # BLD AUTO: 4.96 X10*6/UL (ref 4–5.2)
WBC # BLD AUTO: 7 X10*3/UL (ref 4.4–11.3)

## 2023-11-13 PROCEDURE — 36415 COLL VENOUS BLD VENIPUNCTURE: CPT

## 2023-11-13 PROCEDURE — 86140 C-REACTIVE PROTEIN: CPT

## 2023-11-13 PROCEDURE — 85025 COMPLETE CBC W/AUTO DIFF WBC: CPT

## 2023-11-13 PROCEDURE — 85652 RBC SED RATE AUTOMATED: CPT

## 2023-11-14 ENCOUNTER — APPOINTMENT (OUTPATIENT)
Dept: PRIMARY CARE | Facility: CLINIC | Age: 68
End: 2023-11-14
Payer: MEDICARE

## 2023-11-19 DIAGNOSIS — E78.2 MIXED HYPERLIPIDEMIA: ICD-10-CM

## 2023-11-20 ENCOUNTER — OFFICE VISIT (OUTPATIENT)
Dept: OTOLARYNGOLOGY | Facility: CLINIC | Age: 68
End: 2023-11-20
Payer: MEDICARE

## 2023-11-20 VITALS
DIASTOLIC BLOOD PRESSURE: 77 MMHG | WEIGHT: 199 LBS | HEIGHT: 67 IN | SYSTOLIC BLOOD PRESSURE: 125 MMHG | HEART RATE: 66 BPM | BODY MASS INDEX: 31.23 KG/M2 | TEMPERATURE: 97.3 F

## 2023-11-20 DIAGNOSIS — H90.3 ASYMMETRIC SNHL (SENSORINEURAL HEARING LOSS): ICD-10-CM

## 2023-11-20 DIAGNOSIS — R04.0 EPISTAXIS: Primary | ICD-10-CM

## 2023-11-20 DIAGNOSIS — J34.2 NASAL SEPTAL DEVIATION: ICD-10-CM

## 2023-11-20 PROCEDURE — 3078F DIAST BP <80 MM HG: CPT | Performed by: STUDENT IN AN ORGANIZED HEALTH CARE EDUCATION/TRAINING PROGRAM

## 2023-11-20 PROCEDURE — 99212 OFFICE O/P EST SF 10 MIN: CPT | Performed by: STUDENT IN AN ORGANIZED HEALTH CARE EDUCATION/TRAINING PROGRAM

## 2023-11-20 PROCEDURE — 4010F ACE/ARB THERAPY RXD/TAKEN: CPT | Performed by: STUDENT IN AN ORGANIZED HEALTH CARE EDUCATION/TRAINING PROGRAM

## 2023-11-20 PROCEDURE — 1160F RVW MEDS BY RX/DR IN RCRD: CPT | Performed by: STUDENT IN AN ORGANIZED HEALTH CARE EDUCATION/TRAINING PROGRAM

## 2023-11-20 PROCEDURE — 1159F MED LIST DOCD IN RCRD: CPT | Performed by: STUDENT IN AN ORGANIZED HEALTH CARE EDUCATION/TRAINING PROGRAM

## 2023-11-20 PROCEDURE — 3044F HG A1C LEVEL LT 7.0%: CPT | Performed by: STUDENT IN AN ORGANIZED HEALTH CARE EDUCATION/TRAINING PROGRAM

## 2023-11-20 PROCEDURE — 3074F SYST BP LT 130 MM HG: CPT | Performed by: STUDENT IN AN ORGANIZED HEALTH CARE EDUCATION/TRAINING PROGRAM

## 2023-11-20 PROCEDURE — 1126F AMNT PAIN NOTED NONE PRSNT: CPT | Performed by: STUDENT IN AN ORGANIZED HEALTH CARE EDUCATION/TRAINING PROGRAM

## 2023-11-20 PROCEDURE — 99202 OFFICE O/P NEW SF 15 MIN: CPT | Performed by: STUDENT IN AN ORGANIZED HEALTH CARE EDUCATION/TRAINING PROGRAM

## 2023-11-20 PROCEDURE — 1036F TOBACCO NON-USER: CPT | Performed by: STUDENT IN AN ORGANIZED HEALTH CARE EDUCATION/TRAINING PROGRAM

## 2023-11-20 ASSESSMENT — ENCOUNTER SYMPTOMS
LOSS OF SENSATION IN FEET: 0
OCCASIONAL FEELINGS OF UNSTEADINESS: 0
DEPRESSION: 0

## 2023-11-20 ASSESSMENT — PAIN SCALES - GENERAL: PAINLEVEL: 0-NO PAIN

## 2023-11-20 ASSESSMENT — COLUMBIA-SUICIDE SEVERITY RATING SCALE - C-SSRS
2. HAVE YOU ACTUALLY HAD ANY THOUGHTS OF KILLING YOURSELF?: NO
6. HAVE YOU EVER DONE ANYTHING, STARTED TO DO ANYTHING, OR PREPARED TO DO ANYTHING TO END YOUR LIFE?: NO
1. IN THE PAST MONTH, HAVE YOU WISHED YOU WERE DEAD OR WISHED YOU COULD GO TO SLEEP AND NOT WAKE UP?: NO

## 2023-11-20 NOTE — PROGRESS NOTES
SUBJECTIVE  Patient ID: Carmen Mendez is a 68 y.o. female who presents for Epistaxis (Nose Bleed) (Was having nose bleeds - early Nov.).    The patient reports that she had daily episodes of right-sided epistaxis, 11/6-9. Episodes would last a few minutes and would respond to pressure. Takes a daily ASA and a multivitamin for wet macular degeneration. Has not been an issue since she made the appointment. Has not been an issue before. No history of nasal/sinus surgery/trauma. Did have adenoidectomy. No nasal obstruction.    She incidentally notes a history of prior left-sided sudden sensorineural hearing loss which is resulted in asymmetric sensorineural hearing loss.  This occurred as a child and has been stable throughout adulthood.    Review of Systems  Complete ROS negative except as noted above or on patient intake form and as above.    OBJECTIVE  Physical Exam  CONSTITUTIONAL: Well appearing female who appears stated age.  PSYCHIATRIC: Alert, appropriate mood and affect.  RESPIRATORY: Normal inspiration and expiration and chest wall expansion; no use of accessory muscles to breathe.  VOICE: Clear speech without hoarseness. No stridor nor stertor.  HEAD, FACE, AND SKIN: Symmetric facial features.  EARS: External ears were normally formed with no lesions. The external auditory canals were clear. The tympanic membranes were intact and in the neutral position. No significant retraction pockets, effusions, nor hemotympanum were appreciated.  NOSE: Nasal dorsum was midline. Anterior rhinoscopy demonstrated a septum deviated to the left with prominent spurring at the middle meatus.  There is dry nasal mucosa along the anterior septum on the right.  Some small superficial vessels noted along the septum bilaterally.  No obvious nasal masses, polyps, mucopurulence, nor other lesions were appreciated.  OROPHARYNX: No lesion nor mucosal abnormality. The uvula was normal appearing.  Tonsils are surgically absent.  No blood  in the oropharynx.    ASSESSMENT/PLAN  Diagnoses and all orders for this visit:  Epistaxis  Nasal septal deviation  Asymmetric SNHL (sensorineural hearing loss)    68 y.o. female presenting with acute right-sided epistaxis in the setting of aspirin use.    1.  Right-sided epistaxis  The patient is presenting with daily episodes of epistaxis that began suddenly and then resolved. We discussed the patient's physical exam findings and I offered reassurance.  The patient has evidence of slightly dry nasal mucosa along the right anterior septum.  She has left nasal septal deviation.  Given that her symptoms have resolved and her relatively benign exam at this time I recommended observation.  We discussed that dry air was likely contributing factor to her symptoms.    She can follow-up with me as needed.    2.  Asymmetric sensorineural hearing loss  The patient reports longstanding history of left-sided asymmetric sensorineural hearing loss.  She reports that this occurred as a child after an ear infection.  She has not had further issues.  She can return as needed for this indication as it is already been worked up.    This note was created using speech recognition transcription software. Despite proofreading, typographical errors may be present that affect the meaning of the content. Please contact my office with any questions.

## 2023-11-21 RX ORDER — ATORVASTATIN CALCIUM 20 MG/1
20 TABLET, FILM COATED ORAL DAILY
Qty: 90 TABLET | Refills: 3 | Status: SHIPPED | OUTPATIENT
Start: 2023-11-21

## 2023-12-11 ENCOUNTER — APPOINTMENT (OUTPATIENT)
Dept: PRIMARY CARE | Facility: CLINIC | Age: 68
End: 2023-12-11
Payer: MEDICARE

## 2023-12-13 ENCOUNTER — APPOINTMENT (OUTPATIENT)
Dept: PRIMARY CARE | Facility: CLINIC | Age: 68
End: 2023-12-13
Payer: MEDICARE

## 2023-12-13 ENCOUNTER — OFFICE VISIT (OUTPATIENT)
Dept: PRIMARY CARE | Facility: CLINIC | Age: 68
End: 2023-12-13
Payer: MEDICARE

## 2023-12-13 VITALS
HEIGHT: 67 IN | SYSTOLIC BLOOD PRESSURE: 118 MMHG | OXYGEN SATURATION: 99 % | WEIGHT: 194 LBS | DIASTOLIC BLOOD PRESSURE: 72 MMHG | BODY MASS INDEX: 30.45 KG/M2 | HEART RATE: 74 BPM

## 2023-12-13 DIAGNOSIS — E11.40 TYPE 2 DIABETES MELLITUS WITH DIABETIC NEUROPATHY, WITHOUT LONG-TERM CURRENT USE OF INSULIN (MULTI): ICD-10-CM

## 2023-12-13 LAB — HBA1C MFR BLD: 5.8 % (ref 4.2–6.5)

## 2023-12-13 PROCEDURE — 4010F ACE/ARB THERAPY RXD/TAKEN: CPT | Performed by: STUDENT IN AN ORGANIZED HEALTH CARE EDUCATION/TRAINING PROGRAM

## 2023-12-13 PROCEDURE — 1126F AMNT PAIN NOTED NONE PRSNT: CPT | Performed by: STUDENT IN AN ORGANIZED HEALTH CARE EDUCATION/TRAINING PROGRAM

## 2023-12-13 PROCEDURE — 1159F MED LIST DOCD IN RCRD: CPT | Performed by: STUDENT IN AN ORGANIZED HEALTH CARE EDUCATION/TRAINING PROGRAM

## 2023-12-13 PROCEDURE — 83036 HEMOGLOBIN GLYCOSYLATED A1C: CPT | Mod: CLIA WAIVED TEST | Performed by: STUDENT IN AN ORGANIZED HEALTH CARE EDUCATION/TRAINING PROGRAM

## 2023-12-13 PROCEDURE — 3074F SYST BP LT 130 MM HG: CPT | Performed by: STUDENT IN AN ORGANIZED HEALTH CARE EDUCATION/TRAINING PROGRAM

## 2023-12-13 PROCEDURE — 99213 OFFICE O/P EST LOW 20 MIN: CPT | Performed by: STUDENT IN AN ORGANIZED HEALTH CARE EDUCATION/TRAINING PROGRAM

## 2023-12-13 PROCEDURE — 3044F HG A1C LEVEL LT 7.0%: CPT | Performed by: STUDENT IN AN ORGANIZED HEALTH CARE EDUCATION/TRAINING PROGRAM

## 2023-12-13 PROCEDURE — 3078F DIAST BP <80 MM HG: CPT | Performed by: STUDENT IN AN ORGANIZED HEALTH CARE EDUCATION/TRAINING PROGRAM

## 2023-12-13 PROCEDURE — 1160F RVW MEDS BY RX/DR IN RCRD: CPT | Performed by: STUDENT IN AN ORGANIZED HEALTH CARE EDUCATION/TRAINING PROGRAM

## 2023-12-13 PROCEDURE — 1036F TOBACCO NON-USER: CPT | Performed by: STUDENT IN AN ORGANIZED HEALTH CARE EDUCATION/TRAINING PROGRAM

## 2023-12-13 ASSESSMENT — PATIENT HEALTH QUESTIONNAIRE - PHQ9
SUM OF ALL RESPONSES TO PHQ9 QUESTIONS 1 AND 2: 0
2. FEELING DOWN, DEPRESSED OR HOPELESS: NOT AT ALL
1. LITTLE INTEREST OR PLEASURE IN DOING THINGS: NOT AT ALL

## 2023-12-13 NOTE — PROGRESS NOTES
"Subjective   Patient ID: Carmen Mendez is a 68 y.o. female who presents for Follow-up (Diabetes).    HPI     DM: well controlled, lost about 50 lbs was at 12 in past and now in 5.7. only on amaryl, takes 1 mg BID. Has some neuropathy, and eye issues but sees an eye doctor. Was off glimperide for a little bit but sugars went up     Htn: stable  on ac no lh or dizziness     Hld: stable       Review of Systems   All other systems reviewed and are negative.      Objective   /72 (BP Location: Right arm, Patient Position: Sitting, BP Cuff Size: Small adult)   Pulse 74   Ht 1.702 m (5' 7\")   Wt 88 kg (194 lb)   SpO2 99%   BMI 30.38 kg/m²     Physical Exam  Constitutional:       Appearance: Normal appearance.   HENT:      Head: Normocephalic and atraumatic.      Right Ear: Tympanic membrane and ear canal normal.      Left Ear: Tympanic membrane and ear canal normal.      Mouth/Throat:      Mouth: Mucous membranes are moist.      Pharynx: Oropharynx is clear.   Eyes:      Extraocular Movements: Extraocular movements intact.      Conjunctiva/sclera: Conjunctivae normal.      Pupils: Pupils are equal, round, and reactive to light.   Cardiovascular:      Rate and Rhythm: Normal rate and regular rhythm.      Pulses: Normal pulses.      Heart sounds: Normal heart sounds.   Pulmonary:      Effort: Pulmonary effort is normal.      Breath sounds: Normal breath sounds.   Abdominal:      General: Abdomen is flat. Bowel sounds are normal.      Palpations: Abdomen is soft.   Musculoskeletal:         General: Normal range of motion.      Cervical back: Normal range of motion and neck supple.   Skin:     General: Skin is warm and dry.      Capillary Refill: Capillary refill takes 2 to 3 seconds.   Neurological:      General: No focal deficit present.      Mental Status: She is alert and oriented to person, place, and time. Mental status is at baseline.   Psychiatric:         Mood and Affect: Mood normal.         Behavior: " Behavior normal.         Thought Content: Thought content normal.         Judgment: Judgment normal.         Assessment/Plan   1. Type 2 diabetes mellitus with diabetic neuropathy, without long-term current use of insulin (CMS/[facility])  - was off glimepiride but sugars went up gain so went back on  - A1c today 5.8  - POCT Glycosylated Hemoglobin (HGB A1C) docked device    Follow up 6 months

## 2023-12-19 ENCOUNTER — APPOINTMENT (OUTPATIENT)
Dept: PRIMARY CARE | Facility: CLINIC | Age: 68
End: 2023-12-19
Payer: MEDICARE

## 2023-12-19 ENCOUNTER — OFFICE VISIT (OUTPATIENT)
Dept: PRIMARY CARE | Facility: CLINIC | Age: 68
End: 2023-12-19
Payer: MEDICARE

## 2023-12-19 VITALS
BODY MASS INDEX: 30.7 KG/M2 | SYSTOLIC BLOOD PRESSURE: 116 MMHG | DIASTOLIC BLOOD PRESSURE: 62 MMHG | HEART RATE: 70 BPM | WEIGHT: 196 LBS | OXYGEN SATURATION: 98 %

## 2023-12-19 DIAGNOSIS — S61.219S: Primary | ICD-10-CM

## 2023-12-19 PROBLEM — S61.219A: Status: ACTIVE | Noted: 2023-12-19

## 2023-12-19 PROCEDURE — 3044F HG A1C LEVEL LT 7.0%: CPT | Performed by: STUDENT IN AN ORGANIZED HEALTH CARE EDUCATION/TRAINING PROGRAM

## 2023-12-19 PROCEDURE — 4010F ACE/ARB THERAPY RXD/TAKEN: CPT | Performed by: STUDENT IN AN ORGANIZED HEALTH CARE EDUCATION/TRAINING PROGRAM

## 2023-12-19 PROCEDURE — 3078F DIAST BP <80 MM HG: CPT | Performed by: STUDENT IN AN ORGANIZED HEALTH CARE EDUCATION/TRAINING PROGRAM

## 2023-12-19 PROCEDURE — 1160F RVW MEDS BY RX/DR IN RCRD: CPT | Performed by: STUDENT IN AN ORGANIZED HEALTH CARE EDUCATION/TRAINING PROGRAM

## 2023-12-19 PROCEDURE — 99213 OFFICE O/P EST LOW 20 MIN: CPT | Performed by: STUDENT IN AN ORGANIZED HEALTH CARE EDUCATION/TRAINING PROGRAM

## 2023-12-19 PROCEDURE — 1159F MED LIST DOCD IN RCRD: CPT | Performed by: STUDENT IN AN ORGANIZED HEALTH CARE EDUCATION/TRAINING PROGRAM

## 2023-12-19 PROCEDURE — 1036F TOBACCO NON-USER: CPT | Performed by: STUDENT IN AN ORGANIZED HEALTH CARE EDUCATION/TRAINING PROGRAM

## 2023-12-19 PROCEDURE — 1126F AMNT PAIN NOTED NONE PRSNT: CPT | Performed by: STUDENT IN AN ORGANIZED HEALTH CARE EDUCATION/TRAINING PROGRAM

## 2023-12-19 PROCEDURE — 3074F SYST BP LT 130 MM HG: CPT | Performed by: STUDENT IN AN ORGANIZED HEALTH CARE EDUCATION/TRAINING PROGRAM

## 2023-12-19 ASSESSMENT — ENCOUNTER SYMPTOMS
RESPIRATORY NEGATIVE: 1
PSYCHIATRIC NEGATIVE: 1
EYES NEGATIVE: 1
GASTROINTESTINAL NEGATIVE: 1
ALLERGIC/IMMUNOLOGIC NEGATIVE: 1
CARDIOVASCULAR NEGATIVE: 1
ARTHRALGIAS: 1
NEUROLOGICAL NEGATIVE: 1
HEMATOLOGIC/LYMPHATIC NEGATIVE: 1
CONSTITUTIONAL NEGATIVE: 1
WOUND: 1
ENDOCRINE NEGATIVE: 1

## 2023-12-19 NOTE — PROGRESS NOTES
Subjective   Patient ID: Carmen Mendez is a 68 y.o. female who presents for Laceration (LT POINTER /Happened yesterday afternoon/2018 had tetanus shot per pt ). Cut finger on metal railing yesterday, metal railing was in her house, punctured L index finger, has been applying neosporin.         Review of Systems   Constitutional: Negative.    HENT: Negative.     Eyes: Negative.    Respiratory: Negative.     Cardiovascular: Negative.    Gastrointestinal: Negative.    Endocrine: Negative.    Genitourinary: Negative.    Musculoskeletal:  Positive for arthralgias.   Skin:  Positive for wound.   Allergic/Immunologic: Negative.    Neurological: Negative.    Hematological: Negative.    Psychiatric/Behavioral: Negative.         Objective   /62 (BP Location: Right arm, Patient Position: Sitting, BP Cuff Size: Large adult)   Pulse 70   Wt 88.9 kg (196 lb)   SpO2 98%   BMI 30.70 kg/m²     Physical Exam  Constitutional:       General: She is not in acute distress.     Appearance: Normal appearance.   HENT:      Head: Normocephalic and atraumatic.   Eyes:      Extraocular Movements: Extraocular movements intact.   Cardiovascular:      Rate and Rhythm: Normal rate and regular rhythm.      Pulses: Normal pulses.   Pulmonary:      Effort: Pulmonary effort is normal. No respiratory distress.      Breath sounds: No wheezing.   Chest:      Chest wall: No tenderness.   Abdominal:      General: There is no distension.      Palpations: Abdomen is soft.      Tenderness: There is no guarding.   Musculoskeletal:         General: No tenderness, deformity or signs of injury.      Cervical back: Neck supple. No rigidity.   Skin:     General: Skin is warm and dry.      Findings: Lesion present.      Comments: L index finger small skin lesion, no discharge, neurovascularly intact L index finger   Neurological:      General: No focal deficit present.      Mental Status: She is alert and oriented to person, place, and time.   Psychiatric:          Mood and Affect: Mood normal.         Behavior: Behavior normal.         Assessment/Plan     #L index finger lesion  -tetanus shot up to date  -can continue applying neosporin daily  - no signs of infection doing well  - reassruance

## 2023-12-30 ENCOUNTER — PATIENT MESSAGE (OUTPATIENT)
Dept: PRIMARY CARE | Facility: CLINIC | Age: 68
End: 2023-12-30
Payer: COMMERCIAL

## 2023-12-30 DIAGNOSIS — I10 HYPERTENSION, UNSPECIFIED TYPE: Primary | ICD-10-CM

## 2024-01-02 RX ORDER — LISINOPRIL 20 MG/1
20 TABLET ORAL DAILY
Qty: 90 TABLET | Refills: 1 | Status: SHIPPED | OUTPATIENT
Start: 2024-01-02

## 2024-02-29 ENCOUNTER — PATIENT MESSAGE (OUTPATIENT)
Dept: PRIMARY CARE | Facility: CLINIC | Age: 69
End: 2024-02-29
Payer: COMMERCIAL

## 2024-03-05 ENCOUNTER — APPOINTMENT (OUTPATIENT)
Dept: DERMATOLOGY | Facility: CLINIC | Age: 69
End: 2024-03-05
Payer: COMMERCIAL

## 2024-03-19 ENCOUNTER — OFFICE VISIT (OUTPATIENT)
Dept: DERMATOLOGY | Facility: CLINIC | Age: 69
End: 2024-03-19
Payer: MEDICARE

## 2024-03-19 DIAGNOSIS — D22.9 NEVUS: ICD-10-CM

## 2024-03-19 DIAGNOSIS — Z85.828 PERSONAL HISTORY OF SKIN CANCER: ICD-10-CM

## 2024-03-19 DIAGNOSIS — L82.1 SEBORRHEIC KERATOSIS: ICD-10-CM

## 2024-03-19 DIAGNOSIS — L81.4 LENTIGO: ICD-10-CM

## 2024-03-19 DIAGNOSIS — Z12.83 SKIN CANCER SCREENING: Primary | ICD-10-CM

## 2024-03-19 PROCEDURE — 1159F MED LIST DOCD IN RCRD: CPT | Performed by: NURSE PRACTITIONER

## 2024-03-19 PROCEDURE — 1036F TOBACCO NON-USER: CPT | Performed by: NURSE PRACTITIONER

## 2024-03-19 PROCEDURE — 4010F ACE/ARB THERAPY RXD/TAKEN: CPT | Performed by: NURSE PRACTITIONER

## 2024-03-19 PROCEDURE — 99213 OFFICE O/P EST LOW 20 MIN: CPT | Performed by: NURSE PRACTITIONER

## 2024-03-19 NOTE — PROGRESS NOTES
Subjective     Carmen Mendez is a 69 y.o. female who presents for the following: Skin Check.     Established patient in for yearly skin check.     Review of Systems:  No other skin or systemic complaints other than what is documented elsewhere in the note.    The following portions of the chart were reviewed this encounter and updated as appropriate:       Skin Cancer History  Atypical Melanocytic Hyperplasia-Left posterior thigh- 2014    Specialty Problems          Dermatology Problems    Hemangioma of skin and subcutaneous tissue    Melanocytic nevi of trunk    Nonscarring hair loss, unspecified    Other benign neoplasm of skin of right upper limb, including shoulder    Other melanin hyperpigmentation    Personal history of other malignant neoplasm of skin    Seborrheic dermatitis, unspecified    Solar keratosis    Telogen effluvium    Xerosis cutis    Multiple nevi     Past Medical History:  Carmen Mendez  has a past medical history of Actinic keratosis (10/17/2014), Asymptomatic menopausal state (04/16/2022), Displaced spiral fracture of shaft of humerus, left arm, sequela (08/10/2022), Essential (primary) hypertension (11/16/2013), Essential (primary) hypertension (10/24/2022), Exudative age-related macular degeneration, left eye, stage unspecified (CMS/[facility]) (12/10/2019), History of bad fall, History of ear infections as a child, History of hearing loss, History of tonsillectomy, Melanocytic nevi, unspecified (09/20/2019), Osteoarthritis of knee, unspecified (12/10/2018), Other conditions influencing health status (03/17/2014), Personal history of coronary artery disease, Personal history of other diseases of the musculoskeletal system and connective tissue, Personal history of other diseases of the nervous system and sense organs, Personal history of other endocrine, nutritional and metabolic disease, Personal history of other specified conditions (09/17/2015), Pure hypercholesterolemia, unspecified, Type 2  diabetes mellitus with diabetic polyneuropathy (CMS/[facility]) (2021), Type 2 diabetes mellitus without complications (CMS/[facility]) (10/14/2022), Type 2 diabetes mellitus without complications (CMS/[facility]) (2022), Unspecified macular degeneration (2019), and Unspecified mononeuropathy of unspecified lower limb (2021).    Past Surgical History:  Carmen Mendez  has a past surgical history that includes Other surgical history (10/01/2019); Other surgical history (10/01/2019); Other surgical history (2019); Other surgical history (2019); Other surgical history (2019);  section, classic (2014); Knee surgery (2014); Carpal tunnel release (2014); and Cataract extraction (Bilateral).    Family History:  Patient family history includes Asthma in her son; Autoimmune disease in her daughter; CVA in her father and mother; Diabetes in her mother; Other in an other family member; sinus disorder in her daughter.    Social History:  Carmen Mendez  reports that she has quit smoking. Her smoking use included cigarettes. She has never used smokeless tobacco. She reports that she does not currently use alcohol. She reports that she does not use drugs.    Allergies:  Hydrocodone-acetaminophen, Oxycodone, Tramadol, Cefdinir, Codeine, Thiopental, and Sutures    Current Medications / CAM's:    Current Outpatient Medications:     aspirin 81 mg EC tablet, Take 1 tablet (81 mg) by mouth once daily., Disp: , Rfl:     atorvastatin (Lipitor) 20 mg tablet, TAKE 1 TABLET BY MOUTH EVERY DAY, Disp: 90 tablet, Rfl: 3    glimepiride (Amaryl) 1 mg tablet, Take 1 tablet (1 mg) by mouth once daily in the morning. Take before meals., Disp: , Rfl:     ketoconazole (NIZOral) 2 % cream, APPLY SPARINGLY TO AFFECTED AREA EVERY DAY, Disp: , Rfl:     lisinopril 20 mg tablet, Take 1 tablet (20 mg) by mouth once daily., Disp: 90 tablet, Rfl: 1    mv-min/FA/vit K/lutein/zeaxant (PRESERVISION AREDS 2 PLUS MV  ORAL), Take by mouth., Disp: , Rfl:     Current Facility-Administered Medications:     Tc-99m tetrofosmin (Myoview) injection 10.4 millicurie, 10.4 millicurie, intravenous, Once in imaging, Charly Durand MD    Tc-99m tetrofosmin (Myoview) injection 30.7 millicurie, 30.7 millicurie, intravenous, Once in imaging, Charly Durand MD     Objective   Well appearing patient in no apparent distress; mood and affect are within normal limits.    A full examination was performed including scalp, head, eyes, ears, nose, lips, neck, chest, axillae, abdomen, back, buttocks, bilateral upper extremities, bilateral lower extremities, hands, feet, fingers, toes, fingernails, and toenails. All findings within normal limits unless otherwise noted below.    Assessment/Plan   1. Skin cancer screening    The patient presented for a routine skin examination today. There are no specific concerns regarding skin health and no new or changing moles, lesions, or rashes.     Assessment: Based on the comprehensive skin examination, there were no concerning or abnormal findings. The patient's skin appeared to be in good health, without any notable dermatologic conditions or lesions.    Plan: Given the absence of any significant skin findings, no specific interventions or treatments are warranted at this time. The patient was educated on the importance of regular skin self-examinations and advised to promptly report any changes or concerns. Routine follow-up for a skin examination was recommended.    -These lesions have benign, reassuring patterns on dermoscopy.  -There were no concerning features found on exam today.  -Recommend continued self-observation, and to contact the office if any changes in nevi are  noticed.    Discussed/information given on safe sun practices and use of sunscreen, sun protective clothing or sun avoidance. Recommend to use OTC medication of sunscreen SPF 30 or higher on a daily basis prior to sun exposure to reduce the risk of  "skin cancer.    Contact Office if: Any lesions change in size, shape or color; itch, bum or bleed.         2. Personal history of skin cancer  2014 - left posterior thigh, [facility]    No evidence of recurrence in scar and benign ROS.   Continue with total body skin exams every 6 months.  ABCDEs of melanoma and atypical moles were discussed with the patient.  Patient was instructed to perform monthly self skin examination.  We recommended that the patient have regular full skin exams given an increased risk of subsequent skin cancers.  The patient was instructed to use sun protective behaviors including use of broad spectrum sunscreens and sun protective clothing to reduce risk of skin cancers.      3. Nevus  Multiple benign appearing flesh colored to pigmented macules and papules     Plan: Counseling.  I counseled the patient regarding the following:  Instructions: Monthly self-skin checks to monitor for any changes in moles are recommended. Expectations: Benign Nevi are pigmented nests of cells within the skin.No treatment is necessary. Contact Office if: Any moles change in size, shape or color; itch, bum or bleed.    4. Lentigo  Scattered tan macules in sun-exposed areas.    Solar lentigo (a type of lentigo also known as a senile lentigo, age spot, or liver spot) is a benign pigmented macule appearing on fair-skinned individuals that is related to ultraviolet radiation (UVR) exposure, typically from the sun.     PLAN:  Limiting sun exposure through avoidance, protective clothing, and use of sunscreens can help prevent the appearance of solar lentigines.    If lesion changes or becomes symptomatic she should return to clinic    5. Seborrheic keratosis    Seborrheic keratoses (SKs) are extremely common benign neoplasms of the skin. There can be few or hundreds of these raised, \"stuck-on\"-appearing papules and plaques with well-defined borders. The cause is unknown, although there is a familial trait for the development " of multiple SKs.      SKs tend to increase in incidence and number with increasing age.     Skin Care: Seborrheic Keratoses are benign. No treatment is necessary.    Patient was instructed to call the office if any lesions become irritated or inflamed

## 2024-04-20 ENCOUNTER — PATIENT MESSAGE (OUTPATIENT)
Dept: PRIMARY CARE | Facility: CLINIC | Age: 69
End: 2024-04-20
Payer: COMMERCIAL

## 2024-04-22 NOTE — PATIENT COMMUNICATION
Called pt & was talking about her  abbey - he was in skilled nursing after his replacement  Not icing/elevating because they had no room care orders and that's what she wanted but now she said she is taking him home this afternoon.

## 2024-05-15 ENCOUNTER — PATIENT MESSAGE (OUTPATIENT)
Dept: PRIMARY CARE | Facility: CLINIC | Age: 69
End: 2024-05-15
Payer: COMMERCIAL

## 2024-05-15 DIAGNOSIS — I10 HYPERTENSION, UNSPECIFIED TYPE: ICD-10-CM

## 2024-05-15 DIAGNOSIS — R21 RASH: Primary | ICD-10-CM

## 2024-05-15 RX ORDER — KETOCONAZOLE 20 MG/G
CREAM TOPICAL
Qty: 60 G | Refills: 1 | Status: SHIPPED | OUTPATIENT
Start: 2024-05-15

## 2024-05-15 RX ORDER — HYDROCORTISONE 25 MG/G
CREAM TOPICAL 2 TIMES DAILY
Qty: 28 G | Refills: 1 | Status: SHIPPED | OUTPATIENT
Start: 2024-05-15

## 2024-05-25 ENCOUNTER — PATIENT MESSAGE (OUTPATIENT)
Dept: PRIMARY CARE | Facility: CLINIC | Age: 69
End: 2024-05-25
Payer: COMMERCIAL

## 2024-05-25 DIAGNOSIS — E11.9 TYPE 2 DIABETES MELLITUS WITHOUT COMPLICATION, WITHOUT LONG-TERM CURRENT USE OF INSULIN (MULTI): Primary | ICD-10-CM

## 2024-05-28 RX ORDER — GLIMEPIRIDE 1 MG/1
1 TABLET ORAL
Qty: 90 TABLET | Refills: 1 | Status: SHIPPED | OUTPATIENT
Start: 2024-05-28

## 2024-06-11 ENCOUNTER — APPOINTMENT (OUTPATIENT)
Dept: PRIMARY CARE | Facility: CLINIC | Age: 69
End: 2024-06-11
Payer: MEDICARE

## 2024-06-11 VITALS
WEIGHT: 195 LBS | SYSTOLIC BLOOD PRESSURE: 114 MMHG | DIASTOLIC BLOOD PRESSURE: 72 MMHG | OXYGEN SATURATION: 98 % | HEART RATE: 70 BPM | HEIGHT: 67 IN | BODY MASS INDEX: 30.61 KG/M2

## 2024-06-11 DIAGNOSIS — H35.3220 EXUDATIVE AGE-RELATED MACULAR DEGENERATION OF LEFT EYE, UNSPECIFIED STAGE (MULTI): ICD-10-CM

## 2024-06-11 DIAGNOSIS — E11.40 TYPE 2 DIABETES MELLITUS WITH DIABETIC NEUROPATHY, WITHOUT LONG-TERM CURRENT USE OF INSULIN (MULTI): ICD-10-CM

## 2024-06-11 DIAGNOSIS — I25.119 CORONARY ARTERY DISEASE INVOLVING NATIVE CORONARY ARTERY OF NATIVE HEART WITH ANGINA PECTORIS (CMS-HCC): ICD-10-CM

## 2024-06-11 DIAGNOSIS — D64.9 ANEMIA, UNSPECIFIED TYPE: ICD-10-CM

## 2024-06-11 DIAGNOSIS — E11.9 TYPE 2 DIABETES MELLITUS WITHOUT COMPLICATION, WITHOUT LONG-TERM CURRENT USE OF INSULIN (MULTI): Primary | ICD-10-CM

## 2024-06-11 DIAGNOSIS — Z00.00 ROUTINE GENERAL MEDICAL EXAMINATION AT A HEALTH CARE FACILITY: ICD-10-CM

## 2024-06-11 DIAGNOSIS — Z71.89 ADVANCE CARE PLANNING: ICD-10-CM

## 2024-06-11 DIAGNOSIS — Z13.31 DEPRESSION SCREEN: ICD-10-CM

## 2024-06-11 DIAGNOSIS — E08.3299: ICD-10-CM

## 2024-06-11 DIAGNOSIS — E11.42 PERIPHERAL SENSORY NEUROPATHY DUE TO TYPE 2 DIABETES MELLITUS (MULTI): ICD-10-CM

## 2024-06-11 PROCEDURE — 1159F MED LIST DOCD IN RCRD: CPT | Performed by: STUDENT IN AN ORGANIZED HEALTH CARE EDUCATION/TRAINING PROGRAM

## 2024-06-11 PROCEDURE — G0444 DEPRESSION SCREEN ANNUAL: HCPCS | Performed by: STUDENT IN AN ORGANIZED HEALTH CARE EDUCATION/TRAINING PROGRAM

## 2024-06-11 PROCEDURE — 1036F TOBACCO NON-USER: CPT | Performed by: STUDENT IN AN ORGANIZED HEALTH CARE EDUCATION/TRAINING PROGRAM

## 2024-06-11 PROCEDURE — 3078F DIAST BP <80 MM HG: CPT | Performed by: STUDENT IN AN ORGANIZED HEALTH CARE EDUCATION/TRAINING PROGRAM

## 2024-06-11 PROCEDURE — 1170F FXNL STATUS ASSESSED: CPT | Performed by: STUDENT IN AN ORGANIZED HEALTH CARE EDUCATION/TRAINING PROGRAM

## 2024-06-11 PROCEDURE — 1124F ACP DISCUSS-NO DSCNMKR DOCD: CPT | Performed by: STUDENT IN AN ORGANIZED HEALTH CARE EDUCATION/TRAINING PROGRAM

## 2024-06-11 PROCEDURE — 3074F SYST BP LT 130 MM HG: CPT | Performed by: STUDENT IN AN ORGANIZED HEALTH CARE EDUCATION/TRAINING PROGRAM

## 2024-06-11 PROCEDURE — G0439 PPPS, SUBSEQ VISIT: HCPCS | Performed by: STUDENT IN AN ORGANIZED HEALTH CARE EDUCATION/TRAINING PROGRAM

## 2024-06-11 PROCEDURE — 4010F ACE/ARB THERAPY RXD/TAKEN: CPT | Performed by: STUDENT IN AN ORGANIZED HEALTH CARE EDUCATION/TRAINING PROGRAM

## 2024-06-11 PROCEDURE — 99213 OFFICE O/P EST LOW 20 MIN: CPT | Performed by: STUDENT IN AN ORGANIZED HEALTH CARE EDUCATION/TRAINING PROGRAM

## 2024-06-11 RX ORDER — BLOOD-GLUCOSE METER
100 EACH MISCELLANEOUS DAILY
Qty: 100 STRIP | Refills: 3 | Status: SHIPPED | OUTPATIENT
Start: 2024-06-11

## 2024-06-11 ASSESSMENT — ENCOUNTER SYMPTOMS
LOSS OF SENSATION IN FEET: 1
DEPRESSION: 0
OCCASIONAL FEELINGS OF UNSTEADINESS: 0

## 2024-06-11 ASSESSMENT — ACTIVITIES OF DAILY LIVING (ADL)
TAKING_MEDICATION: INDEPENDENT
MANAGING_FINANCES: INDEPENDENT
DRESSING: INDEPENDENT
DOING_HOUSEWORK: INDEPENDENT
GROCERY_SHOPPING: INDEPENDENT
BATHING: INDEPENDENT

## 2024-06-11 NOTE — PROGRESS NOTES
"Subjective   Patient ID: Carmen Mendez is a 69 y.o. female who presents for Medicare Annual Wellness Visit Subsequent (Not fasting).    HPI     DM: well controlled, lost about 50 lbs was at 12 in past and now in 5.7. only on amaryl, takes 1 mg BID. Has some neuropathy, and eye issues but sees an eye doctor. Was off glimperide for a little bit but sugars went up     HTN: stable  on ac no lh or dizziness     HLD: stable     Sochx; denies smoking or drinking    Review of Systems   All other systems reviewed and are negative.      Objective   /72 (BP Location: Right arm, Patient Position: Sitting)   Pulse 70   Ht 1.702 m (5' 7\")   Wt 88.5 kg (195 lb)   SpO2 98%   BMI 30.54 kg/m²     Physical Exam  Constitutional:       Appearance: Normal appearance.   HENT:      Head: Normocephalic and atraumatic.      Right Ear: Tympanic membrane and ear canal normal.      Left Ear: Tympanic membrane and ear canal normal.      Mouth/Throat:      Mouth: Mucous membranes are moist.      Pharynx: Oropharynx is clear.   Eyes:      Extraocular Movements: Extraocular movements intact.      Conjunctiva/sclera: Conjunctivae normal.      Pupils: Pupils are equal, round, and reactive to light.   Cardiovascular:      Rate and Rhythm: Normal rate and regular rhythm.      Pulses: Normal pulses.      Heart sounds: Normal heart sounds.   Pulmonary:      Effort: Pulmonary effort is normal.      Breath sounds: Normal breath sounds.   Abdominal:      General: Abdomen is flat. Bowel sounds are normal.      Palpations: Abdomen is soft.   Musculoskeletal:         General: Normal range of motion.      Cervical back: Normal range of motion and neck supple.   Skin:     General: Skin is warm and dry.      Capillary Refill: Capillary refill takes 2 to 3 seconds.   Neurological:      General: No focal deficit present.      Mental Status: She is alert and oriented to person, place, and time. Mental status is at baseline.   Psychiatric:         Mood and " Affect: Mood normal.         Behavior: Behavior normal.         Thought Content: Thought content normal.         Judgment: Judgment normal.       Assessment/Plan   1. Type 2 diabetes mellitus without complication, without long-term current use of insulin (Multi)  A1c is doing well check labs  Continue meds  - blood sugar diagnostic (OneTouch Verio test strips) strip; 100 strips once daily. Test once daily  Dispense: 100 strip; Refill: 3    2. Exudative age-related macular degeneration of left eye, unspecified stage (Multi)  Follows optho   Doing well      4. Coronary artery disease involving native coronary artery of native heart with angina pectoris (CMS-HCC)  Stable  On statin and   On asa  No issues    5. Routine general medical examination at a health care facility    - CBC and Auto Differential  - Comprehensive Metabolic Panel  - Lipid Panel  - TSH with reflex to Free T4 if abnormal  - Hemoglobin A1C    6. Anemia, unspecified type    - CBC and Auto Differential      8. Background diabetic retinopathy associated with diabetes mellitus due to underlying condition (Multi)  Stable no issues    9. Peripheral sensory neuropathy due to type 2 diabetes mellitus (Multi)  Some neuropathy   Overall ok    Depression screen  - denies    Advance care planning  Going to get advance directives    Tobacco/Alcohol/Opioid use, as well as Illicit Drug Use was screened for/reviewed and documented in Social Documentation section of the chart and medication list as appropriate    Depression Screening  Depression screening completed using the PHQ-2 questions, with results documented in the chart/encounter (~15min).  (See Rooming Screening section for documentation, or progress note for additional information)    Cardiac Risk Assessment  The 10-year ASCVD risk score (Martinez MANZANO, et al., 2019) is: 16.3%    Values used to calculate the score:      Age: 69 years      Sex: Female      Is Non- : No      Diabetic:  Yes      Tobacco smoker: No      Systolic Blood Pressure: 114 mmHg      Is BP treated: Yes      HDL Cholesterol: 93.5 mg/dL      Total Cholesterol: 176 mg/dL  Cardiovascular risk was discussed and, if needed, lifestyle modifications recommended, including nutritional choices, exercise, and elimination of habits contributing to risk. We agreed on a plan to reduce the current cardiovascular risk based on above discussion as needed.     Aspirin use/disuse was discussed and documented in the Problem List of the medical record (under Cardiac Risk Counseling) after reviewing the updated guidelines below:  Consider low dose Aspirin ( mg) use if the benefit for cardiovascular disease prevention outweighs risk for bleeding complications.   In general, low dose ASA should be considered:  In patients WITHOUT prior MI/stroke/PAD (primary prevention):   a. Age <60: Use if 10-year cardiovascular disease risk >20%, with discussion of risks and benefits with patient  b. Age 60-<70: Use if 10-year cardiovascular disease risk >20% and low bleeding (e.g., gastrointestinal) risk, with discussion of risks and benefits with patient  c. Age >=70: Do not use    In patients WITH prior MI/stroke/PAD (secondary prevention):   Generally use unless extremely high bleeding (e.g., gastrointenstinal) risk, with discussion of risks and benefits with patient    Advance Directives Discussion  Advanced Care Planning (including a Living Will, Healthcare POA, as well as specific end of life choices and/or directives), was discussed with the patient and/or surrogate, voluntarily, and details of that discussion documented in the Problem List (under Advanced Directives Discussion) of the medical record.    (~16 min spent discussing above)     During the course of the visit the patient was educated and counseled about age appropriate screening and preventive services.   Completed preventive screenings were documented in the chart (see Routine Health  Maintenance in Problem List) and orders were placed for outstanding screenings/procedures as documented in the Assessment and Plan.

## 2024-06-17 ENCOUNTER — PATIENT MESSAGE (OUTPATIENT)
Dept: PRIMARY CARE | Facility: CLINIC | Age: 69
End: 2024-06-17
Payer: COMMERCIAL

## 2024-06-17 DIAGNOSIS — M85.852 OTHER SPECIFIED DISORDERS OF BONE DENSITY AND STRUCTURE, LEFT THIGH: ICD-10-CM

## 2024-06-17 DIAGNOSIS — E28.39 OTHER PRIMARY OVARIAN FAILURE: ICD-10-CM

## 2024-06-25 ENCOUNTER — HOSPITAL ENCOUNTER (OUTPATIENT)
Dept: RADIOLOGY | Facility: CLINIC | Age: 69
Discharge: HOME | End: 2024-06-25
Payer: MEDICARE

## 2024-06-25 DIAGNOSIS — E28.39 OTHER PRIMARY OVARIAN FAILURE: ICD-10-CM

## 2024-06-25 DIAGNOSIS — M85.852 OTHER SPECIFIED DISORDERS OF BONE DENSITY AND STRUCTURE, LEFT THIGH: ICD-10-CM

## 2024-06-25 PROCEDURE — 77080 DXA BONE DENSITY AXIAL: CPT

## 2024-07-22 ENCOUNTER — HOSPITAL ENCOUNTER (OUTPATIENT)
Dept: RADIOLOGY | Facility: CLINIC | Age: 69
Discharge: HOME | End: 2024-07-22
Payer: MEDICARE

## 2024-07-22 DIAGNOSIS — R22.32 LOCALIZED SWELLING ON LEFT HAND: ICD-10-CM

## 2024-07-22 PROCEDURE — 73130 X-RAY EXAM OF HAND: CPT | Mod: LEFT SIDE | Performed by: RADIOLOGY

## 2024-07-22 PROCEDURE — 73130 X-RAY EXAM OF HAND: CPT | Mod: LT

## 2024-07-26 ENCOUNTER — TELEPHONE (OUTPATIENT)
Dept: PRIMARY CARE | Facility: CLINIC | Age: 69
End: 2024-07-26
Payer: COMMERCIAL

## 2024-07-26 ENCOUNTER — TELEPHONE (OUTPATIENT)
Dept: CARDIOLOGY | Facility: CLINIC | Age: 69
End: 2024-07-26
Payer: COMMERCIAL

## 2024-07-26 NOTE — TELEPHONE ENCOUNTER
States she just left a message on the nurse line but forgot to mention that she takes an aspirin and elba vitamin  Has a hematoma and wondering if she should come off her aspirin?

## 2024-07-26 NOTE — TELEPHONE ENCOUNTER
PT WENT TO URGENT CARE AFTER FALL. HAD XRAYS=NO BREAK. DX WITH HEMATOMA OF HAND.PT STATES DISCOLORATION NOW SPREADING. HAS APPT C/O BLOOD CLOT. HAS APPT MONDAY.

## 2024-07-26 NOTE — TELEPHONE ENCOUNTER
Pt LM stating she went to  Urgent care due to an injury and was informed she has a hematoma on her hand. Pt called PCP who advised her to call cardiology if BASA should be held. Per vm, pt was also asking about holding MVI and Preservision.    Unable to view Urgent Care records via [facility] and Care Everywhere.    Should pt hold BASA?    Please advise. Thanks.

## 2024-07-29 ENCOUNTER — APPOINTMENT (OUTPATIENT)
Dept: PRIMARY CARE | Facility: CLINIC | Age: 69
End: 2024-07-29
Payer: MEDICARE

## 2024-07-29 VITALS
OXYGEN SATURATION: 98 % | WEIGHT: 196 LBS | SYSTOLIC BLOOD PRESSURE: 130 MMHG | BODY MASS INDEX: 30.7 KG/M2 | DIASTOLIC BLOOD PRESSURE: 74 MMHG | HEART RATE: 76 BPM

## 2024-07-29 DIAGNOSIS — S60.222S: Primary | ICD-10-CM

## 2024-07-29 PROCEDURE — 4010F ACE/ARB THERAPY RXD/TAKEN: CPT | Performed by: STUDENT IN AN ORGANIZED HEALTH CARE EDUCATION/TRAINING PROGRAM

## 2024-07-29 PROCEDURE — 1159F MED LIST DOCD IN RCRD: CPT | Performed by: STUDENT IN AN ORGANIZED HEALTH CARE EDUCATION/TRAINING PROGRAM

## 2024-07-29 PROCEDURE — 3075F SYST BP GE 130 - 139MM HG: CPT | Performed by: STUDENT IN AN ORGANIZED HEALTH CARE EDUCATION/TRAINING PROGRAM

## 2024-07-29 PROCEDURE — 1036F TOBACCO NON-USER: CPT | Performed by: STUDENT IN AN ORGANIZED HEALTH CARE EDUCATION/TRAINING PROGRAM

## 2024-07-29 PROCEDURE — 99213 OFFICE O/P EST LOW 20 MIN: CPT | Performed by: STUDENT IN AN ORGANIZED HEALTH CARE EDUCATION/TRAINING PROGRAM

## 2024-07-29 PROCEDURE — 3078F DIAST BP <80 MM HG: CPT | Performed by: STUDENT IN AN ORGANIZED HEALTH CARE EDUCATION/TRAINING PROGRAM

## 2024-07-29 ASSESSMENT — PATIENT HEALTH QUESTIONNAIRE - PHQ9
2. FEELING DOWN, DEPRESSED OR HOPELESS: NOT AT ALL
1. LITTLE INTEREST OR PLEASURE IN DOING THINGS: NOT AT ALL
SUM OF ALL RESPONSES TO PHQ9 QUESTIONS 1 AND 2: 0

## 2024-07-29 ASSESSMENT — ENCOUNTER SYMPTOMS: DEPRESSION: 0

## 2024-07-29 NOTE — PROGRESS NOTES
Subjective   Patient ID: Carmen Mendez is a 69 y.o. female who presents for Follow-up (Went to urgent care/Happened last Tuesday /Thinks its a hematoma/LT hand-bruised/painful).    HPI     Hand pain: states she was carry plastic groceries that were heavy on her hand next day started to bruise and swell.  Increase pain. Went to [facility] care who did an xray showed no fracure. Since last week swelilgn improved but has some pain and discoloration. Otherwise good flow and vascular flw    Review of Systems   All other systems reviewed and are negative.      Objective   /74 (BP Location: Right arm, Patient Position: Sitting, BP Cuff Size: Small adult)   Pulse 76   Wt 88.9 kg (196 lb)   SpO2 98%   BMI 30.70 kg/m²     Physical Exam  Constitutional:       Appearance: Normal appearance.   HENT:      Head: Normocephalic and atraumatic.      Right Ear: Tympanic membrane and ear canal normal.      Left Ear: Tympanic membrane and ear canal normal.      Mouth/Throat:      Mouth: Mucous membranes are moist.      Pharynx: Oropharynx is clear.   Eyes:      Extraocular Movements: Extraocular movements intact.      Conjunctiva/sclera: Conjunctivae normal.      Pupils: Pupils are equal, round, and reactive to light.   Cardiovascular:      Rate and Rhythm: Normal rate and regular rhythm.      Pulses: Normal pulses.      Heart sounds: Normal heart sounds.   Pulmonary:      Effort: Pulmonary effort is normal.      Breath sounds: Normal breath sounds.   Abdominal:      General: Abdomen is flat. Bowel sounds are normal.      Palpations: Abdomen is soft.   Musculoskeletal:         General: Normal range of motion.      Cervical back: Normal range of motion and neck supple.   Skin:     General: Skin is warm and dry.      Capillary Refill: Capillary refill takes 2 to 3 seconds.   Neurological:      General: No focal deficit present.      Mental Status: She is alert and oriented to person, place, and time. Mental status is at baseline.    Psychiatric:         Mood and Affect: Mood normal.         Behavior: Behavior normal.         Thought Content: Thought content normal.         Judgment: Judgment normal.         Assessment/Plan   1. Traumatic ecchymosis of left hand, sequela  Reassruance  Elevation, warm compress  Hold asa for now

## 2024-08-01 ENCOUNTER — APPOINTMENT (OUTPATIENT)
Dept: ORTHOPEDIC SURGERY | Facility: CLINIC | Age: 69
End: 2024-08-01
Payer: MEDICARE

## 2024-08-03 ENCOUNTER — PATIENT MESSAGE (OUTPATIENT)
Dept: PRIMARY CARE | Facility: CLINIC | Age: 69
End: 2024-08-03
Payer: COMMERCIAL

## 2024-08-03 DIAGNOSIS — I10 HYPERTENSION, UNSPECIFIED TYPE: ICD-10-CM

## 2024-08-05 RX ORDER — LISINOPRIL 20 MG/1
20 TABLET ORAL DAILY
Qty: 90 TABLET | Refills: 1 | OUTPATIENT
Start: 2024-08-05

## 2024-08-05 RX ORDER — LISINOPRIL 20 MG/1
20 TABLET ORAL DAILY
Qty: 90 TABLET | Refills: 1 | Status: SHIPPED | OUTPATIENT
Start: 2024-08-05

## 2024-08-10 DIAGNOSIS — E78.2 MIXED HYPERLIPIDEMIA: Primary | ICD-10-CM

## 2024-08-12 RX ORDER — ATORVASTATIN CALCIUM 20 MG/1
20 TABLET, FILM COATED ORAL DAILY
Qty: 90 TABLET | Refills: 3 | Status: SHIPPED | OUTPATIENT
Start: 2024-08-12

## 2024-09-17 ENCOUNTER — OFFICE VISIT (OUTPATIENT)
Dept: PRIMARY CARE | Facility: CLINIC | Age: 69
End: 2024-09-17
Payer: MEDICARE

## 2024-09-17 VITALS
HEART RATE: 76 BPM | BODY MASS INDEX: 31.01 KG/M2 | DIASTOLIC BLOOD PRESSURE: 82 MMHG | SYSTOLIC BLOOD PRESSURE: 128 MMHG | WEIGHT: 198 LBS | OXYGEN SATURATION: 98 %

## 2024-09-17 DIAGNOSIS — S61.209S OPEN WOUND OF FINGER, SEQUELA: Primary | ICD-10-CM

## 2024-09-17 PROCEDURE — 99213 OFFICE O/P EST LOW 20 MIN: CPT | Performed by: STUDENT IN AN ORGANIZED HEALTH CARE EDUCATION/TRAINING PROGRAM

## 2024-09-17 PROCEDURE — 1159F MED LIST DOCD IN RCRD: CPT | Performed by: STUDENT IN AN ORGANIZED HEALTH CARE EDUCATION/TRAINING PROGRAM

## 2024-09-17 PROCEDURE — 1036F TOBACCO NON-USER: CPT | Performed by: STUDENT IN AN ORGANIZED HEALTH CARE EDUCATION/TRAINING PROGRAM

## 2024-09-17 PROCEDURE — 1123F ACP DISCUSS/DSCN MKR DOCD: CPT | Performed by: STUDENT IN AN ORGANIZED HEALTH CARE EDUCATION/TRAINING PROGRAM

## 2024-09-17 PROCEDURE — 3079F DIAST BP 80-89 MM HG: CPT | Performed by: STUDENT IN AN ORGANIZED HEALTH CARE EDUCATION/TRAINING PROGRAM

## 2024-09-17 PROCEDURE — 4010F ACE/ARB THERAPY RXD/TAKEN: CPT | Performed by: STUDENT IN AN ORGANIZED HEALTH CARE EDUCATION/TRAINING PROGRAM

## 2024-09-17 PROCEDURE — 3074F SYST BP LT 130 MM HG: CPT | Performed by: STUDENT IN AN ORGANIZED HEALTH CARE EDUCATION/TRAINING PROGRAM

## 2024-09-17 PROCEDURE — 1158F ADVNC CARE PLAN TLK DOCD: CPT | Performed by: STUDENT IN AN ORGANIZED HEALTH CARE EDUCATION/TRAINING PROGRAM

## 2024-09-17 RX ORDER — CEPHALEXIN 500 MG/1
500 CAPSULE ORAL 3 TIMES DAILY
Qty: 30 CAPSULE | Refills: 0 | Status: SHIPPED | OUTPATIENT
Start: 2024-09-17 | End: 2024-09-27

## 2024-09-17 ASSESSMENT — PATIENT HEALTH QUESTIONNAIRE - PHQ9
1. LITTLE INTEREST OR PLEASURE IN DOING THINGS: NOT AT ALL
SUM OF ALL RESPONSES TO PHQ9 QUESTIONS 1 AND 2: 0
2. FEELING DOWN, DEPRESSED OR HOPELESS: NOT AT ALL

## 2024-09-17 ASSESSMENT — ENCOUNTER SYMPTOMS: DEPRESSION: 0

## 2024-09-17 NOTE — PROGRESS NOTES
Subjective   Patient ID: Carmen Mendez is a 69 y.o. female who presents for Follow-up (Has a sore / cut on RT pinky finger/Painful/chunk of skin came off/not sure what she did/Tried Vaseline and neosporin /X6 days/).    HPI     Injure pinking finger, had a scab or cut and had a small superfial wound as been using neosporing an vaseoline for a barrier    Review of Systems   All other systems reviewed and are negative.      Objective   /82 (BP Location: Left arm, Patient Position: Sitting, BP Cuff Size: Large adult)   Pulse 76   Wt 89.8 kg (198 lb)   SpO2 98%   BMI 31.01 kg/m²     Physical Exam  Constitutional:       Appearance: Normal appearance.   HENT:      Head: Normocephalic and atraumatic.      Right Ear: Tympanic membrane and ear canal normal.      Left Ear: Tympanic membrane and ear canal normal.      Mouth/Throat:      Mouth: Mucous membranes are moist.      Pharynx: Oropharynx is clear.   Eyes:      Extraocular Movements: Extraocular movements intact.      Conjunctiva/sclera: Conjunctivae normal.      Pupils: Pupils are equal, round, and reactive to light.   Cardiovascular:      Rate and Rhythm: Normal rate and regular rhythm.      Pulses: Normal pulses.      Heart sounds: Normal heart sounds.   Pulmonary:      Effort: Pulmonary effort is normal.      Breath sounds: Normal breath sounds.   Abdominal:      General: Abdomen is flat. Bowel sounds are normal.      Palpations: Abdomen is soft.   Musculoskeletal:         General: Normal range of motion.      Cervical back: Normal range of motion and neck supple.   Skin:     General: Skin is warm and dry.      Capillary Refill: Capillary refill takes 2 to 3 seconds.   Neurological:      General: No focal deficit present.      Mental Status: She is alert and oriented to person, place, and time. Mental status is at baseline.   Psychiatric:         Mood and Affect: Mood normal.         Behavior: Behavior normal.         Thought Content: Thought content  normal.         Judgment: Judgment normal.         Assessment/Plan   1. Open wound of finger, sequela  - abx in case it gets wore  - neosporin  Small wound will montior  - cephalexin (Keflex) 500 mg capsule; Take 1 capsule (500 mg) by mouth 3 times a day for 10 days.  Dispense: 30 capsule; Refill: 0  d

## 2024-09-19 ENCOUNTER — OFFICE VISIT (OUTPATIENT)
Dept: DERMATOLOGY | Facility: CLINIC | Age: 69
End: 2024-09-19
Payer: MEDICARE

## 2024-09-19 DIAGNOSIS — D22.60 MELANOCYTIC NEVI OF UNSPECIFIED UPPER LIMB, INCLUDING SHOULDER: Primary | ICD-10-CM

## 2024-09-19 DIAGNOSIS — L29.9 PRURITUS: ICD-10-CM

## 2024-09-19 PROCEDURE — 1159F MED LIST DOCD IN RCRD: CPT | Performed by: DERMATOLOGY

## 2024-09-19 PROCEDURE — 99213 OFFICE O/P EST LOW 20 MIN: CPT | Performed by: DERMATOLOGY

## 2024-09-19 PROCEDURE — 4010F ACE/ARB THERAPY RXD/TAKEN: CPT | Performed by: DERMATOLOGY

## 2024-09-19 PROCEDURE — 1036F TOBACCO NON-USER: CPT | Performed by: DERMATOLOGY

## 2024-09-19 PROCEDURE — 1123F ACP DISCUSS/DSCN MKR DOCD: CPT | Performed by: DERMATOLOGY

## 2024-09-19 ASSESSMENT — DERMATOLOGY PATIENT ASSESSMENT
WHERE ARE THESE NEW OR CHANGING LESIONS LOCATED: LEFT ANTERIOR SHOULDER
FOR PATIENTS COMING IN FOR A FOLLOW-UP VISIT - HAVE THERE BEEN ANY CHANGES IN YOUR HEALTH SINCE YOUR LAST VISIT: NO
ARE YOU TRYING TO GET PREGNANT: NO
DO YOU HAVE ANY NEW OR CHANGING LESIONS: YES
ARE YOU ON BIRTH CONTROL: NO
DO YOU USE SUNSCREEN: OCCASIONALLY
DO YOU USE A TANNING BED: NO

## 2024-09-19 ASSESSMENT — DERMATOLOGY QUALITY OF LIFE (QOL) ASSESSMENT
RATE HOW EMOTIONALLY BOTHERED YOU ARE BY YOUR SKIN PROBLEM (FOR EXAMPLE, WORRY, EMBARRASSMENT, FRUSTRATION): 2
ARE THERE EXCLUSIONS OR EXCEPTIONS FOR THE QUALITY OF LIFE ASSESSMENT: NO
RATE HOW BOTHERED YOU ARE BY EFFECTS OF YOUR SKIN PROBLEMS ON YOUR ACTIVITIES (EG, GOING OUT, ACCOMPLISHING WHAT YOU WANT, WORK ACTIVITIES OR YOUR RELATIONSHIPS WITH OTHERS): 0 - NEVER BOTHERED
WHAT SINGLE SKIN CONDITION LISTED BELOW IS THE PATIENT ANSWERING THE QUALITY-OF-LIFE ASSESSMENT QUESTIONS ABOUT: NONE OF THE ABOVE
RATE HOW BOTHERED YOU ARE BY SYMPTOMS OF YOUR SKIN PROBLEM (EG, ITCHING, STINGING BURNING, HURTING OR SKIN IRRITATION): 3

## 2024-09-19 ASSESSMENT — PATIENT GLOBAL ASSESSMENT (PGA): PATIENT GLOBAL ASSESSMENT: PATIENT GLOBAL ASSESSMENT:  2 - MILD

## 2024-09-19 ASSESSMENT — ITCH NUMERIC RATING SCALE: HOW SEVERE IS YOUR ITCHING?: 5

## 2024-09-19 NOTE — PROGRESS NOTES
Subjective     Carmen Mendez is a 69 y.o. female who presents for the following: Suspicious Skin Lesion (Pt her for lesion on left anterior shoulder. Lesion is itchy. Hx of Atypical Melanocytic Hyperplasia(Left posterior thigh-2014). Pt has had surgery x2 in that arm also pins and eb placed. /) and Itching (Pt states her scalp is very itchy. Hx of Seborrheic Dermatitis not on scalp though. ).     Review of Systems:  No other skin or systemic complaints other than what is documented elsewhere in the note.    The following portions of the chart were reviewed this encounter and updated as appropriate:          Skin Cancer History  No skin cancer on file.      Specialty Problems          Dermatology Problems    Hemangioma of skin and subcutaneous tissue    Melanocytic nevi of trunk    Nonscarring hair loss, unspecified    Other benign neoplasm of skin of right upper limb, including shoulder    Other melanin hyperpigmentation    Personal history of other malignant neoplasm of skin    Seborrheic dermatitis, unspecified    Solar keratosis    Telogen effluvium    Xerosis cutis    Multiple nevi        Objective   Well appearing patient in no apparent distress; mood and affect are within normal limits.    A focused skin examination was performed of the left proximal arm, scalp. All findings within normal limits unless otherwise noted below.    Assessment/Plan   1. Melanocytic nevi of unspecified upper limb, including shoulder (2)  Left Shoulder - Anterior, Left Upper Arm - Anterior  Scattered, uniform and benign-appearing, regular brown melanocytic papules and macules.  Right proximal shoulder with dome shaped symmetric tan to brown papule    Clinically benign appearing nevi, no treatment is necessary.  The importance of sun protection was reviewed: including the use of a broad spectrum sunscreen that protects against both UVA/UVB rays, with ingredients such as Zinc oxide or titanium dioxide, wearing sun protective clothing  and sun avoidance.   ABCDEs of melanoma reviewed.  Please follow up should you notice any new or changing pre-existing skin lesion.    2. Pruritus  Scalp  Normal appearing scalp    Reassured normal appearing scalp     Itching likely neuropathic vs. Psychogenic.    Patient wanted to be sure okay and will defer further treatment at this time.

## 2024-09-21 ENCOUNTER — TELEPHONE (OUTPATIENT)
Dept: PRIMARY CARE | Facility: CLINIC | Age: 69
End: 2024-09-21
Payer: COMMERCIAL

## 2024-09-21 DIAGNOSIS — U07.1 COVID-19: Primary | ICD-10-CM

## 2024-09-21 RX ORDER — NIRMATRELVIR AND RITONAVIR 300-100 MG
3 KIT ORAL 2 TIMES DAILY
Qty: 30 TABLET | Refills: 0 | Status: SHIPPED | OUTPATIENT
Start: 2024-09-21 | End: 2024-09-26

## 2024-09-21 NOTE — TELEPHONE ENCOUNTER
Patient called by this practitioner called the on-call service to states she was positive for COVID.  Discussed side effects of Paxlovid and medications to hold.  Will send in Rx for Paxlovid patient is unsure if she wants to take it.  Will follow-up with PCP next week if she is not feeling any better

## 2024-09-27 ENCOUNTER — APPOINTMENT (OUTPATIENT)
Dept: PRIMARY CARE | Facility: CLINIC | Age: 69
End: 2024-09-27
Payer: COMMERCIAL

## 2024-09-30 PROBLEM — S99.929A INJURY OF TOE: Status: ACTIVE | Noted: 2024-09-30

## 2024-09-30 PROBLEM — M25.511 PAIN OF RIGHT SHOULDER REGION: Status: ACTIVE | Noted: 2024-09-30

## 2024-09-30 PROBLEM — E11.40 TYPE 2 DIABETES MELLITUS WITH DIABETIC NEUROPATHY, UNSPECIFIED (MULTI): Status: RESOLVED | Noted: 2019-09-30 | Resolved: 2024-09-30

## 2024-09-30 PROBLEM — M62.81 MUSCLE WEAKNESS OF UPPER EXTREMITY: Status: RESOLVED | Noted: 2024-09-30 | Resolved: 2024-09-30

## 2024-09-30 PROBLEM — E11.40 DIABETIC NEUROPATHY (MULTI): Status: ACTIVE | Noted: 2024-09-30

## 2024-09-30 PROBLEM — M79.646 THUMB PAIN: Status: ACTIVE | Noted: 2024-09-30

## 2024-09-30 PROBLEM — R23.3 PETECHIAE: Status: ACTIVE | Noted: 2024-09-30

## 2024-09-30 PROBLEM — E67.8 EXCESSIVE VITAMIN INTAKE: Status: ACTIVE | Noted: 2024-09-30

## 2024-09-30 PROBLEM — M25.511 PAIN OF RIGHT SHOULDER REGION: Status: RESOLVED | Noted: 2024-09-30 | Resolved: 2024-09-30

## 2024-09-30 PROBLEM — K92.1 HEMATOCHEZIA: Status: ACTIVE | Noted: 2024-09-30

## 2024-09-30 PROBLEM — Z87.891 PERSONAL HISTORY OF NICOTINE DEPENDENCE: Status: RESOLVED | Noted: 2021-08-25 | Resolved: 2024-09-30

## 2024-09-30 PROBLEM — R10.30 INGUINAL PAIN: Status: ACTIVE | Noted: 2024-09-30

## 2024-09-30 PROBLEM — M62.81 MUSCLE WEAKNESS OF UPPER EXTREMITY: Status: ACTIVE | Noted: 2024-09-30

## 2024-09-30 PROBLEM — M25.612 DECREASED RANGE OF MOTION OF LEFT SHOULDER: Status: RESOLVED | Noted: 2023-09-25 | Resolved: 2024-09-30

## 2024-10-04 ENCOUNTER — TELEPHONE (OUTPATIENT)
Dept: PRIMARY CARE | Facility: CLINIC | Age: 69
End: 2024-10-04
Payer: COMMERCIAL

## 2024-10-07 ENCOUNTER — APPOINTMENT (OUTPATIENT)
Dept: PRIMARY CARE | Facility: CLINIC | Age: 69
End: 2024-10-07
Payer: COMMERCIAL

## 2024-10-07 VITALS
WEIGHT: 191 LBS | HEART RATE: 80 BPM | BODY MASS INDEX: 29.91 KG/M2 | DIASTOLIC BLOOD PRESSURE: 64 MMHG | SYSTOLIC BLOOD PRESSURE: 114 MMHG | OXYGEN SATURATION: 97 %

## 2024-10-07 DIAGNOSIS — R09.82 POST-NASAL DRIP: Primary | ICD-10-CM

## 2024-10-07 PROCEDURE — 1123F ACP DISCUSS/DSCN MKR DOCD: CPT | Performed by: STUDENT IN AN ORGANIZED HEALTH CARE EDUCATION/TRAINING PROGRAM

## 2024-10-07 PROCEDURE — 3078F DIAST BP <80 MM HG: CPT | Performed by: STUDENT IN AN ORGANIZED HEALTH CARE EDUCATION/TRAINING PROGRAM

## 2024-10-07 PROCEDURE — 1036F TOBACCO NON-USER: CPT | Performed by: STUDENT IN AN ORGANIZED HEALTH CARE EDUCATION/TRAINING PROGRAM

## 2024-10-07 PROCEDURE — 3074F SYST BP LT 130 MM HG: CPT | Performed by: STUDENT IN AN ORGANIZED HEALTH CARE EDUCATION/TRAINING PROGRAM

## 2024-10-07 PROCEDURE — 99213 OFFICE O/P EST LOW 20 MIN: CPT | Performed by: STUDENT IN AN ORGANIZED HEALTH CARE EDUCATION/TRAINING PROGRAM

## 2024-10-07 PROCEDURE — 1159F MED LIST DOCD IN RCRD: CPT | Performed by: STUDENT IN AN ORGANIZED HEALTH CARE EDUCATION/TRAINING PROGRAM

## 2024-10-07 PROCEDURE — 4010F ACE/ARB THERAPY RXD/TAKEN: CPT | Performed by: STUDENT IN AN ORGANIZED HEALTH CARE EDUCATION/TRAINING PROGRAM

## 2024-10-07 ASSESSMENT — PATIENT HEALTH QUESTIONNAIRE - PHQ9
SUM OF ALL RESPONSES TO PHQ9 QUESTIONS 1 AND 2: 0
1. LITTLE INTEREST OR PLEASURE IN DOING THINGS: NOT AT ALL
2. FEELING DOWN, DEPRESSED OR HOPELESS: NOT AT ALL

## 2024-10-07 ASSESSMENT — ENCOUNTER SYMPTOMS: DEPRESSION: 0

## 2024-10-07 NOTE — PROGRESS NOTES
Subjective   Patient ID: Carmen Mendez is a 69 y.o. female who presents for Sinus Problem (Covid + 2 1/2 weeks ago/Congested/nasally ).    HPI     Recent covid infection. Got better now has gotten worse, pressure was there but is gone after covid, pain is better, has post nasal drip and congested, worse at night. Take nothing. Discussed flonase, and saline spray, does not want meds usually    Review of Systems   All other systems reviewed and are negative.      Objective   /64 (BP Location: Right arm, Patient Position: Sitting, BP Cuff Size: Small adult)   Pulse 80   Wt 86.6 kg (191 lb)   SpO2 97%   BMI 29.91 kg/m²     Physical Exam  Constitutional:       Appearance: Normal appearance.   HENT:      Head: Normocephalic and atraumatic.      Right Ear: Tympanic membrane and ear canal normal.      Left Ear: Tympanic membrane and ear canal normal.      Mouth/Throat:      Mouth: Mucous membranes are moist.      Pharynx: Oropharynx is clear.   Eyes:      Extraocular Movements: Extraocular movements intact.      Conjunctiva/sclera: Conjunctivae normal.      Pupils: Pupils are equal, round, and reactive to light.   Cardiovascular:      Rate and Rhythm: Normal rate and regular rhythm.      Pulses: Normal pulses.      Heart sounds: Normal heart sounds.   Pulmonary:      Effort: Pulmonary effort is normal.      Breath sounds: Normal breath sounds.   Abdominal:      General: Abdomen is flat. Bowel sounds are normal.      Palpations: Abdomen is soft.   Musculoskeletal:         General: Normal range of motion.      Cervical back: Normal range of motion and neck supple.   Skin:     General: Skin is warm and dry.      Capillary Refill: Capillary refill takes 2 to 3 seconds.   Neurological:      General: No focal deficit present.      Mental Status: She is alert and oriented to person, place, and time. Mental status is at baseline.   Psychiatric:         Mood and Affect: Mood normal.         Behavior: Behavior normal.          Thought Content: Thought content normal.         Judgment: Judgment normal.         Assessment/Plan   1. Post-nasal drip  Reassurance was given  Recommend flonase and NS spray  Mucinex DM recommend

## 2024-10-10 ENCOUNTER — OFFICE VISIT (OUTPATIENT)
Dept: OTOLARYNGOLOGY | Facility: CLINIC | Age: 69
End: 2024-10-10
Payer: MEDICARE

## 2024-10-10 VITALS
HEIGHT: 67 IN | OXYGEN SATURATION: 98 % | DIASTOLIC BLOOD PRESSURE: 75 MMHG | WEIGHT: 197.5 LBS | HEART RATE: 68 BPM | TEMPERATURE: 98 F | RESPIRATION RATE: 16 BRPM | BODY MASS INDEX: 31 KG/M2 | SYSTOLIC BLOOD PRESSURE: 119 MMHG

## 2024-10-10 DIAGNOSIS — H93.8X1 SENSATION OF FULLNESS IN RIGHT EAR: ICD-10-CM

## 2024-10-10 DIAGNOSIS — H90.3 ASYMMETRIC SNHL (SENSORINEURAL HEARING LOSS): Primary | ICD-10-CM

## 2024-10-10 DIAGNOSIS — J34.89 NASAL DRAINAGE: ICD-10-CM

## 2024-10-10 PROCEDURE — 1123F ACP DISCUSS/DSCN MKR DOCD: CPT | Performed by: STUDENT IN AN ORGANIZED HEALTH CARE EDUCATION/TRAINING PROGRAM

## 2024-10-10 PROCEDURE — 3078F DIAST BP <80 MM HG: CPT | Performed by: STUDENT IN AN ORGANIZED HEALTH CARE EDUCATION/TRAINING PROGRAM

## 2024-10-10 PROCEDURE — 1036F TOBACCO NON-USER: CPT | Performed by: STUDENT IN AN ORGANIZED HEALTH CARE EDUCATION/TRAINING PROGRAM

## 2024-10-10 PROCEDURE — 1126F AMNT PAIN NOTED NONE PRSNT: CPT | Performed by: STUDENT IN AN ORGANIZED HEALTH CARE EDUCATION/TRAINING PROGRAM

## 2024-10-10 PROCEDURE — 99213 OFFICE O/P EST LOW 20 MIN: CPT | Performed by: STUDENT IN AN ORGANIZED HEALTH CARE EDUCATION/TRAINING PROGRAM

## 2024-10-10 PROCEDURE — 1160F RVW MEDS BY RX/DR IN RCRD: CPT | Performed by: STUDENT IN AN ORGANIZED HEALTH CARE EDUCATION/TRAINING PROGRAM

## 2024-10-10 PROCEDURE — 3074F SYST BP LT 130 MM HG: CPT | Performed by: STUDENT IN AN ORGANIZED HEALTH CARE EDUCATION/TRAINING PROGRAM

## 2024-10-10 PROCEDURE — 4010F ACE/ARB THERAPY RXD/TAKEN: CPT | Performed by: STUDENT IN AN ORGANIZED HEALTH CARE EDUCATION/TRAINING PROGRAM

## 2024-10-10 PROCEDURE — 3008F BODY MASS INDEX DOCD: CPT | Performed by: STUDENT IN AN ORGANIZED HEALTH CARE EDUCATION/TRAINING PROGRAM

## 2024-10-10 PROCEDURE — 1159F MED LIST DOCD IN RCRD: CPT | Performed by: STUDENT IN AN ORGANIZED HEALTH CARE EDUCATION/TRAINING PROGRAM

## 2024-10-10 SDOH — ECONOMIC STABILITY: FOOD INSECURITY: WITHIN THE PAST 12 MONTHS, THE FOOD YOU BOUGHT JUST DIDN'T LAST AND YOU DIDN'T HAVE MONEY TO GET MORE.: NEVER TRUE

## 2024-10-10 SDOH — ECONOMIC STABILITY: FOOD INSECURITY: WITHIN THE PAST 12 MONTHS, YOU WORRIED THAT YOUR FOOD WOULD RUN OUT BEFORE YOU GOT MONEY TO BUY MORE.: NEVER TRUE

## 2024-10-10 ASSESSMENT — LIFESTYLE VARIABLES
HOW MANY STANDARD DRINKS CONTAINING ALCOHOL DO YOU HAVE ON A TYPICAL DAY: PATIENT DOES NOT DRINK
HOW OFTEN DO YOU HAVE A DRINK CONTAINING ALCOHOL: NEVER
AUDIT-C TOTAL SCORE: 0
HOW OFTEN DO YOU HAVE SIX OR MORE DRINKS ON ONE OCCASION: NEVER
SKIP TO QUESTIONS 9-10: 1

## 2024-10-10 ASSESSMENT — ENCOUNTER SYMPTOMS
DEPRESSION: 0
OCCASIONAL FEELINGS OF UNSTEADINESS: 0
LOSS OF SENSATION IN FEET: 1

## 2024-10-10 ASSESSMENT — COLUMBIA-SUICIDE SEVERITY RATING SCALE - C-SSRS
6. HAVE YOU EVER DONE ANYTHING, STARTED TO DO ANYTHING, OR PREPARED TO DO ANYTHING TO END YOUR LIFE?: NO
2. HAVE YOU ACTUALLY HAD ANY THOUGHTS OF KILLING YOURSELF?: NO
1. IN THE PAST MONTH, HAVE YOU WISHED YOU WERE DEAD OR WISHED YOU COULD GO TO SLEEP AND NOT WAKE UP?: NO

## 2024-10-10 ASSESSMENT — PATIENT HEALTH QUESTIONNAIRE - PHQ9
2. FEELING DOWN, DEPRESSED OR HOPELESS: NOT AT ALL
SUM OF ALL RESPONSES TO PHQ9 QUESTIONS 1 AND 2: 0
1. LITTLE INTEREST OR PLEASURE IN DOING THINGS: NOT AT ALL

## 2024-10-10 ASSESSMENT — PAIN SCALES - GENERAL: PAINLEVEL: 0-NO PAIN

## 2024-10-10 NOTE — PROGRESS NOTES
SUBJECTIVE  Patient ID: Carmen Mendez is a 69 y.o. female who presents for Follow-up (Post nasal drip).    History 11/20/2023:  The patient reports that she had daily episodes of right-sided epistaxis, 11/6-9. Episodes would last a few minutes and would respond to pressure. Takes a daily ASA and a multivitamin for wet macular degeneration. Has not been an issue since she made the appointment. Has not been an issue before. No history of nasal/sinus surgery/trauma. Did have adenoidectomy. No nasal obstruction.    She incidentally notes a history of prior left-sided sudden sensorineural hearing loss which is resulted in asymmetric sensorineural hearing loss.  This occurred as a child and has been stable throughout adulthood.    Update 10/10/2024:  She reports that she had COVID-19 infection some three weeks ago. Noted loss in sense of smell taste, nasal congestion, and ear fullness. These symptoms are improved. However, still has constant post-nasal drainage. Nasal breathing is good. Denies facial pain/pressure at this time.    OBJECTIVE  Physical Exam  CONSTITUTIONAL: Well appearing female who appears stated age.  PSYCHIATRIC: Alert, appropriate mood and affect.  RESPIRATORY: Normal inspiration and expiration and chest wall expansion; no use of accessory muscles to breathe.  VOICE: Clear speech without hoarseness. No stridor nor stertor.  HEAD, FACE, AND SKIN: Symmetric facial features.  EARS: External ears were normally formed with no lesions. The external auditory canals were clear. The tympanic membranes were intact; globally retracted on the right and neutral on the left. No significant retraction pockets, effusions, nor hemotympanum were appreciated.  NOSE: Nasal dorsum was midline. Anterior rhinoscopy demonstrated a septum deviated to the left with prominent spurring at the middle meatus.  The nasal mucosa is moist.  There is mild ITH bilaterally. No obvious nasal masses, polyps, mucopurulence, nor other lesions  were appreciated.  OROPHARYNX: No lesion nor mucosal abnormality. The uvula was normal appearing.  Tonsils are surgically absent.  No drainage in the oropharynx.    ASSESSMENT/PLAN  Diagnoses and all orders for this visit:  Asymmetric SNHL (sensorineural hearing loss)  Sensation of fullness in right ear  Nasal drainage      69 y.o. female presenting with acute right-sided epistaxis in the setting of aspirin use.    1.  Nasal drainage, recent COVID URI  The patient reports that several weeks back she developed COVID-19 infection.  She had multiple symptoms consistent with COVID-19 sinus infection including facial pressure, nasal congestion, nasal drainage, and loss of sense of smell/taste.  She notes that most of her symptoms have resolved, but she is having persistent postnasal drainage.  Given her relatively benign exam as well as mostly improving symptoms, reassurance was provided that I do not believe she has a bacterial infection at this time.  We discussed potential treatments for postnasal drainage including observation, saline spray, and/or INCS.  She is not interested in medicated therapy at this time.    2.  Right ear fullness, suspected acute ETD  The patient notes that with her recent sinus infection she had complete loss of hearing on the right.  This significantly scared her as she has known profound hearing loss on the left.  However, over the last several weeks she has had slow improvement in hearing.  On exam she has retraction of the tympanic membrane on the right.  Findings and history are suspicious for acute eustachian tube dysfunction in the setting of upper respiratory infection.  Reassurance was provided.  I did recommend that she follow through with planned audiogram in the next several weeks to check on her hearing as it has been several decades since her last audiogram.    3.  Asymmetric sensorineural hearing loss  The patient reports longstanding history of left-sided asymmetric  sensorineural hearing loss.  She reports that this occurred as a child after an ear infection.  She has not had further issues.  She can return as needed for this indication as it has already been worked up.    She can follow-up with me as needed.    This note was created using speech recognition transcription software. Despite proofreading, typographical errors may be present that affect the meaning of the content. Please contact my office with any questions.

## 2024-10-10 NOTE — PATIENT INSTRUCTIONS
Thank you for visiting our office today. For future questions or appointments please try to call the office directly at [phone].

## 2024-10-11 ENCOUNTER — APPOINTMENT (OUTPATIENT)
Dept: PRIMARY CARE | Facility: CLINIC | Age: 69
End: 2024-10-11
Payer: COMMERCIAL

## 2024-10-11 PROCEDURE — 90662 IIV NO PRSV INCREASED AG IM: CPT | Performed by: STUDENT IN AN ORGANIZED HEALTH CARE EDUCATION/TRAINING PROGRAM

## 2024-10-11 PROCEDURE — G0008 ADMIN INFLUENZA VIRUS VAC: HCPCS | Performed by: STUDENT IN AN ORGANIZED HEALTH CARE EDUCATION/TRAINING PROGRAM

## 2024-10-18 ENCOUNTER — LAB (OUTPATIENT)
Dept: LAB | Facility: LAB | Age: 69
End: 2024-10-18
Payer: COMMERCIAL

## 2024-10-18 DIAGNOSIS — Z00.00 ROUTINE GENERAL MEDICAL EXAMINATION AT A HEALTH CARE FACILITY: ICD-10-CM

## 2024-10-18 DIAGNOSIS — E11.9 TYPE 2 DIABETES MELLITUS WITHOUT COMPLICATION, WITHOUT LONG-TERM CURRENT USE OF INSULIN (MULTI): ICD-10-CM

## 2024-10-18 DIAGNOSIS — D64.9 ANEMIA, UNSPECIFIED TYPE: ICD-10-CM

## 2024-10-18 LAB
ALBUMIN SERPL BCP-MCNC: 4.2 G/DL (ref 3.4–5)
ALP SERPL-CCNC: 51 U/L (ref 33–136)
ALT SERPL W P-5'-P-CCNC: 13 U/L (ref 7–45)
ANION GAP SERPL CALC-SCNC: 13 MMOL/L (ref 10–20)
AST SERPL W P-5'-P-CCNC: 16 U/L (ref 9–39)
BASOPHILS # BLD AUTO: 0.01 X10*3/UL (ref 0–0.1)
BASOPHILS NFR BLD AUTO: 0.2 %
BILIRUB SERPL-MCNC: 0.9 MG/DL (ref 0–1.2)
BUN SERPL-MCNC: 13 MG/DL (ref 6–23)
CALCIUM SERPL-MCNC: 9.7 MG/DL (ref 8.6–10.3)
CHLORIDE SERPL-SCNC: 104 MMOL/L (ref 98–107)
CHOLEST SERPL-MCNC: 161 MG/DL (ref 0–199)
CHOLESTEROL/HDL RATIO: 1.9
CO2 SERPL-SCNC: 27 MMOL/L (ref 21–32)
CREAT SERPL-MCNC: 0.69 MG/DL (ref 0.5–1.05)
EGFRCR SERPLBLD CKD-EPI 2021: >90 ML/MIN/1.73M*2
EOSINOPHIL # BLD AUTO: 0.2 X10*3/UL (ref 0–0.7)
EOSINOPHIL NFR BLD AUTO: 3.5 %
ERYTHROCYTE [DISTWIDTH] IN BLOOD BY AUTOMATED COUNT: 13.3 % (ref 11.5–14.5)
EST. AVERAGE GLUCOSE BLD GHB EST-MCNC: 128 MG/DL
GLUCOSE SERPL-MCNC: 102 MG/DL (ref 74–99)
HBA1C MFR BLD: 6.1 %
HCT VFR BLD AUTO: 44.1 % (ref 36–46)
HDLC SERPL-MCNC: 85.1 MG/DL
HGB BLD-MCNC: 14.2 G/DL (ref 12–16)
IMM GRANULOCYTES # BLD AUTO: 0.02 X10*3/UL (ref 0–0.7)
IMM GRANULOCYTES NFR BLD AUTO: 0.4 % (ref 0–0.9)
LDLC SERPL CALC-MCNC: 63 MG/DL
LYMPHOCYTES # BLD AUTO: 1.9 X10*3/UL (ref 1.2–4.8)
LYMPHOCYTES NFR BLD AUTO: 33.5 %
MCH RBC QN AUTO: 29.4 PG (ref 26–34)
MCHC RBC AUTO-ENTMCNC: 32.2 G/DL (ref 32–36)
MCV RBC AUTO: 91 FL (ref 80–100)
MONOCYTES # BLD AUTO: 0.45 X10*3/UL (ref 0.1–1)
MONOCYTES NFR BLD AUTO: 7.9 %
NEUTROPHILS # BLD AUTO: 3.1 X10*3/UL (ref 1.2–7.7)
NEUTROPHILS NFR BLD AUTO: 54.5 %
NON HDL CHOLESTEROL: 76 MG/DL (ref 0–149)
NRBC BLD-RTO: 0 /100 WBCS (ref 0–0)
PLATELET # BLD AUTO: 218 X10*3/UL (ref 150–450)
POTASSIUM SERPL-SCNC: 5 MMOL/L (ref 3.5–5.3)
PROT SERPL-MCNC: 6.6 G/DL (ref 6.4–8.2)
RBC # BLD AUTO: 4.83 X10*6/UL (ref 4–5.2)
SODIUM SERPL-SCNC: 139 MMOL/L (ref 136–145)
TRIGL SERPL-MCNC: 63 MG/DL (ref 0–149)
TSH SERPL-ACNC: 2.36 MIU/L (ref 0.44–3.98)
VLDL: 13 MG/DL (ref 0–40)
WBC # BLD AUTO: 5.7 X10*3/UL (ref 4.4–11.3)

## 2024-10-18 PROCEDURE — 85025 COMPLETE CBC W/AUTO DIFF WBC: CPT

## 2024-10-18 PROCEDURE — 83036 HEMOGLOBIN GLYCOSYLATED A1C: CPT

## 2024-10-18 PROCEDURE — 80053 COMPREHEN METABOLIC PANEL: CPT

## 2024-10-21 ENCOUNTER — PATIENT MESSAGE (OUTPATIENT)
Dept: PRIMARY CARE | Facility: CLINIC | Age: 69
End: 2024-10-21
Payer: COMMERCIAL

## 2024-10-21 DIAGNOSIS — Z12.31 SCREENING MAMMOGRAM, ENCOUNTER FOR: ICD-10-CM

## 2024-10-22 ENCOUNTER — APPOINTMENT (OUTPATIENT)
Dept: CARDIOLOGY | Facility: CLINIC | Age: 69
End: 2024-10-22
Payer: COMMERCIAL

## 2024-10-22 VITALS
SYSTOLIC BLOOD PRESSURE: 108 MMHG | BODY MASS INDEX: 29.98 KG/M2 | OXYGEN SATURATION: 97 % | HEART RATE: 95 BPM | HEIGHT: 67 IN | WEIGHT: 191 LBS | DIASTOLIC BLOOD PRESSURE: 70 MMHG

## 2024-10-22 DIAGNOSIS — I25.10 CORONARY ARTERY DISEASE INVOLVING NATIVE CORONARY ARTERY OF NATIVE HEART WITHOUT ANGINA PECTORIS: ICD-10-CM

## 2024-10-22 DIAGNOSIS — I10 ESSENTIAL (PRIMARY) HYPERTENSION: Primary | ICD-10-CM

## 2024-10-22 DIAGNOSIS — E78.2 MIXED HYPERLIPIDEMIA: ICD-10-CM

## 2024-10-22 DIAGNOSIS — I10 HYPERTENSION, UNSPECIFIED TYPE: ICD-10-CM

## 2024-10-22 DIAGNOSIS — R07.89 CHEST PAIN, MIDSTERNAL: ICD-10-CM

## 2024-10-22 PROBLEM — I25.119 CORONARY ARTERY DISEASE INVOLVING NATIVE CORONARY ARTERY OF NATIVE HEART WITH ANGINA PECTORIS: Status: RESOLVED | Noted: 2023-09-25 | Resolved: 2024-10-22

## 2024-10-22 LAB
ATRIAL RATE: 76 BPM
P AXIS: 41 DEGREES
P OFFSET: 195 MS
P ONSET: 143 MS
PR INTERVAL: 152 MS
Q ONSET: 219 MS
QRS COUNT: 12 BEATS
QRS DURATION: 94 MS
QT INTERVAL: 408 MS
QTC CALCULATION(BAZETT): 459 MS
QTC FREDERICIA: 441 MS
R AXIS: -13 DEGREES
T AXIS: 45 DEGREES
T OFFSET: 423 MS
VENTRICULAR RATE: 76 BPM

## 2024-10-22 PROCEDURE — 3044F HG A1C LEVEL LT 7.0%: CPT | Performed by: INTERNAL MEDICINE

## 2024-10-22 PROCEDURE — 1123F ACP DISCUSS/DSCN MKR DOCD: CPT | Performed by: INTERNAL MEDICINE

## 2024-10-22 PROCEDURE — 3008F BODY MASS INDEX DOCD: CPT | Performed by: INTERNAL MEDICINE

## 2024-10-22 PROCEDURE — 99213 OFFICE O/P EST LOW 20 MIN: CPT | Performed by: INTERNAL MEDICINE

## 2024-10-22 PROCEDURE — 93005 ELECTROCARDIOGRAM TRACING: CPT | Performed by: INTERNAL MEDICINE

## 2024-10-22 PROCEDURE — 4010F ACE/ARB THERAPY RXD/TAKEN: CPT | Performed by: INTERNAL MEDICINE

## 2024-10-22 PROCEDURE — 3078F DIAST BP <80 MM HG: CPT | Performed by: INTERNAL MEDICINE

## 2024-10-22 PROCEDURE — 3074F SYST BP LT 130 MM HG: CPT | Performed by: INTERNAL MEDICINE

## 2024-10-22 PROCEDURE — 1036F TOBACCO NON-USER: CPT | Performed by: INTERNAL MEDICINE

## 2024-10-22 PROCEDURE — 3048F LDL-C <100 MG/DL: CPT | Performed by: INTERNAL MEDICINE

## 2024-10-22 PROCEDURE — 1159F MED LIST DOCD IN RCRD: CPT | Performed by: INTERNAL MEDICINE

## 2024-10-22 RX ORDER — ATORVASTATIN CALCIUM 20 MG/1
20 TABLET, FILM COATED ORAL DAILY
Qty: 90 TABLET | Refills: 3 | Status: SHIPPED | OUTPATIENT
Start: 2024-10-22

## 2024-10-22 RX ORDER — LISINOPRIL 20 MG/1
20 TABLET ORAL DAILY
Qty: 90 TABLET | Refills: 3 | Status: SHIPPED | OUTPATIENT
Start: 2024-10-22

## 2024-10-22 ASSESSMENT — ENCOUNTER SYMPTOMS: DYSPNEA ON EXERTION: 1

## 2024-10-22 NOTE — PROGRESS NOTES
Subjective   Carmen Mendez is a 69 y.o. female.    Chief Complaint:  Yearly follow-up for coronary disease.    HPI    Over the past year she has had developed cardiac issues.  She developed COVID.  She had for a few days somewhat pleuritic chest pain in the right anterior chest area.  No midsternal or positional discomfort.  She also had loss of hearing and has been seen and followed by the ENT service.  Has had no anginal symptoms.  Tolerating her medications well.  No emergency room visits for chest pain.    Her diagnosis of coronary artery disease is based on calcifications in the mid left anterior descending coronary artery seen on a prior CT scan of the chest. There is moderate atherosclerosis present.     Her cardiac history is significant for hypertension. Risk factors for coronary artery disease include hypertension, 27 years of diabetes, a 10-pack-year smoking history, positive family history of vascular disease, and a history of hyperlipidemia.     Past surgical history consistent with a history of bilateral total knee replacements,  section, carpal tunnel, and cataract surgeries. Also has had left arm surgery and right shoulder surgery.     She is  with 3 children. 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     Review of Systems   Constitutional: Positive for malaise/fatigue.   HENT:  Positive for hearing loss.    Cardiovascular:  Positive for dyspnea on exertion.   Musculoskeletal:  Positive for arthritis.       Current Outpatient Medications   Medication Sig Dispense Refill    aspirin 81 mg EC tablet Take 1 tablet (81 mg) by mouth once daily.      atorvastatin (Lipitor) 20 mg tablet TAKE 1 TABLET BY MOUTH EVERY DAY 90 tablet 3    blood sugar diagnostic (OneTouch Verio test strips) strip 100 strips once daily. Test once daily 100 strip 3    glimepiride (Amaryl) 1 mg tablet Take 1 tablet (1 mg) by mouth once daily in the morning. Take before meals. 90 tablet 1     "hydrocortisone 2.5 % cream Apply topically 2 times a day. (Patient taking differently: Apply topically if needed.) 28 g 1    ketoconazole (NIZOral) 2 % cream APPLY SPARINGLY TO AFFECTED AREA EVERY DAY 60 g 1    lisinopril 20 mg tablet Take 1 tablet (20 mg) by mouth once daily. 90 tablet 1    mv-min/FA/vit K/lutein/zeaxant (PRESERVISION AREDS 2 PLUS MV ORAL) Take by mouth.       Current Facility-Administered Medications   Medication Dose Route Frequency Provider Last Rate Last Admin    Tc-99m tetrofosmin (Myoview) injection 10.4 millicurie  10.4 millicurie intravenous Once in imaging Charly Durand MD        Tc-99m tetrofosmin (Myoview) injection 30.7 millicurie  30.7 millicurie intravenous Once in imaging Charly Durand MD            Visit Vitals  /70 (BP Location: Left arm)   Pulse 95   Ht 1.702 m (5' 7\")   Wt 86.6 kg (191 lb)   SpO2 97%   BMI 29.91 kg/m²   OB Status Postmenopausal   Smoking Status Former   BSA 2.02 m²        Objective     Constitutional:       Appearance: Not in distress.   Neck:      Vascular: JVD normal.   Pulmonary:      Breath sounds: Normal breath sounds.   Cardiovascular:      Normal rate. Regular rhythm. Normal S1. Normal S2.       Murmurs: There is no murmur.      No gallop.    Pulses:     Intact distal pulses.   Edema:     Peripheral edema absent.   Abdominal:      General: There is no distension.      Palpations: Abdomen is soft.   Neurological:      Mental Status: Alert.         Lab Review:   Lab Results   Component Value Date     10/18/2024    K 5.0 10/18/2024     10/18/2024    CO2 27 10/18/2024    BUN 13 10/18/2024    CREATININE 0.69 10/18/2024    GLUCOSE 102 (H) 10/18/2024    CALCIUM 9.7 10/18/2024     Lab Results   Component Value Date    CHOL 161 10/18/2024    TRIG 63 10/18/2024    HDL 85.1 10/18/2024       Assessment:    1.  Coronary disease.  A stress thallium study in October 2023 showed no ischemic changes with a normal left ventricular ejection fraction.  Her " coronary disease is limited to the left anterior descending coronary artery.  Very unlikely that this is a false negative study.    2.  Hypertension.  Blood pressures are low.    3.  Hyperlipidemia.  Cholesterol 161, HDL 85, LDL 63.    4.  Atypical chest pain.  May be related to her COVID infection.  This is quickly resolved.

## 2024-10-23 ENCOUNTER — OFFICE VISIT (OUTPATIENT)
Dept: OTOLARYNGOLOGY | Facility: CLINIC | Age: 69
End: 2024-10-23
Payer: MEDICARE

## 2024-10-23 ENCOUNTER — CLINICAL SUPPORT (OUTPATIENT)
Dept: AUDIOLOGY | Facility: CLINIC | Age: 69
End: 2024-10-23
Payer: MEDICARE

## 2024-10-23 VITALS
TEMPERATURE: 98.1 F | DIASTOLIC BLOOD PRESSURE: 75 MMHG | SYSTOLIC BLOOD PRESSURE: 111 MMHG | HEIGHT: 67 IN | WEIGHT: 192 LBS | HEART RATE: 75 BPM | BODY MASS INDEX: 30.13 KG/M2

## 2024-10-23 DIAGNOSIS — H90.3 ASYMMETRIC SNHL (SENSORINEURAL HEARING LOSS): Primary | ICD-10-CM

## 2024-10-23 DIAGNOSIS — H93.13 TINNITUS OF BOTH EARS: ICD-10-CM

## 2024-10-23 DIAGNOSIS — H93.11 TINNITUS, RIGHT: ICD-10-CM

## 2024-10-23 DIAGNOSIS — H90.3 SNHL (SENSORY-NEURAL HEARING LOSS), ASYMMETRICAL: Primary | ICD-10-CM

## 2024-10-23 PROCEDURE — 3044F HG A1C LEVEL LT 7.0%: CPT | Performed by: NURSE PRACTITIONER

## 2024-10-23 PROCEDURE — 92557 COMPREHENSIVE HEARING TEST: CPT | Performed by: AUDIOLOGIST

## 2024-10-23 PROCEDURE — 3078F DIAST BP <80 MM HG: CPT | Performed by: NURSE PRACTITIONER

## 2024-10-23 PROCEDURE — 92550 TYMPANOMETRY & REFLEX THRESH: CPT | Performed by: AUDIOLOGIST

## 2024-10-23 PROCEDURE — 1123F ACP DISCUSS/DSCN MKR DOCD: CPT | Performed by: NURSE PRACTITIONER

## 2024-10-23 PROCEDURE — 3048F LDL-C <100 MG/DL: CPT | Performed by: NURSE PRACTITIONER

## 2024-10-23 PROCEDURE — 4010F ACE/ARB THERAPY RXD/TAKEN: CPT | Performed by: NURSE PRACTITIONER

## 2024-10-23 PROCEDURE — 1126F AMNT PAIN NOTED NONE PRSNT: CPT | Performed by: NURSE PRACTITIONER

## 2024-10-23 PROCEDURE — 1036F TOBACCO NON-USER: CPT | Performed by: NURSE PRACTITIONER

## 2024-10-23 PROCEDURE — 1159F MED LIST DOCD IN RCRD: CPT | Performed by: NURSE PRACTITIONER

## 2024-10-23 PROCEDURE — 3074F SYST BP LT 130 MM HG: CPT | Performed by: NURSE PRACTITIONER

## 2024-10-23 PROCEDURE — 99213 OFFICE O/P EST LOW 20 MIN: CPT | Performed by: NURSE PRACTITIONER

## 2024-10-23 PROCEDURE — 3008F BODY MASS INDEX DOCD: CPT | Performed by: NURSE PRACTITIONER

## 2024-10-23 PROCEDURE — 99203 OFFICE O/P NEW LOW 30 MIN: CPT | Performed by: NURSE PRACTITIONER

## 2024-10-23 ASSESSMENT — ENCOUNTER SYMPTOMS
OCCASIONAL FEELINGS OF UNSTEADINESS: 0
DEPRESSION: 0
LOSS OF SENSATION IN FEET: 0

## 2024-10-23 ASSESSMENT — PAIN SCALES - GENERAL: PAINLEVEL_OUTOF10: 0-NO PAIN

## 2024-10-23 NOTE — PROGRESS NOTES
"AUDIOLOGY ADULT AUDIOMETRIC EVALUATION      Name:  Cramen Mendez  :  1955  Age:  69 y.o.  Date of Evaluation:  10/23/2024    HISTORY  Reason for visit:  hearing loss  Ms. Mendez is seen 10/23/2024 at the request of Trinity Agee CNP  for an evaluation of hearing.      Chief complaint:    - had covid-19 infection the 3rd week of September;   - one week later, right hearing worsened and had gradually improving; she judges that it has not returned to baseline based on how loud her television is adjusted  - \"echo\" \"in a tunnel\" on the right  - left hearing loss at age 11 years; thought to be related to a virus     Hearing loss:  history of left hearing loss at age 11 years (thought to be related to a virus); worsening of right hearing around the end of 2024 after covid-19 infection  Tinnitus:   occasional whistling tinnitus, right; chirping prior to covid-19  Otitis Media: frequent ear infections in childhood  Otologic surgical history:  denies   Dizziness/imbalance:  occasional imbalance; has neuropathy  Otalgia:  occasionally, had ear pain with covid-19; no pain at this time  Ear pressure/fullness:  right  History of excessive noise exposure:  denies  Other: none    Hearing aid history: none          EVALUATION  Please find audiogram in \"Media\" tab (Document Type:  Audiology Report) or included at the bottom of this note.    RESULTS   Otoscopic Evaluation: clear canals bilaterally      Immittance Measures (226 Hz probe tone):   Tympanometry is consistent with normal middle ear pressure and normal tympanic membrane mobility bilaterally.       Ipsilateral acoustic reflexes (500-4000 Hz) are present for the right ear for 500-2000 Hz (absent 4000 Hz) and absent for the left ear for 500-4000 Hz.        Test technique:  standard behavioral technique via insert earphones.  Reliability is good.    Pure Tone Audiometry:    Right ear:  Hearing sensitivity is in the normal hearing to moderately-severe hearing " loss range.     Left ear: Hearing sensitivity is in the moderately-severe to profound hearing loss range.   Hearing loss is sensorineural bilaterally.       Note asymmetry across the frequency range (left worse than right)     Speech Audiometry:        Right Ear:  Speech Reception Threshold (SRT) was obtained at 10 dBHL                 Speech discrimination score was 100% in quiet when words were presented at 60 dBHL (10 item NU-6 ordered-by-difficulty list).        Left Ear:  Speech Awareness Threshold (SAT) was obtained at 65 dBHL                 Speech discrimination score was 0% in quiet when words were presented at 100 dBHL (10 item NU-6 ordered-by-difficulty list).      Note speech discrimination score asymmetry (left worse than right)     IMPRESSIONS:  Patient is expected to have communication difficulty in adverse listening environments.    Patient is expected to benefit from effective communication strategies and may additionally benefit from devices that improve the desired sound signal over that of background noise.        RECOMMENDATIONS  Continue with medical follow-up with Trinity Agee CNP.  Consider a Hearing Device Evaluation with an audiologist to discuss hearing technology (such as CROS, remote microphone, or bone-conduction technology) and services, pending medical management and medical clearance.     Reassess hearing in conjunction with medical management and at least annually.     Continue with medical follow-up as indicated.      PATIENT EDUCATION  Discussed results and recommendations with patient.  Questions were addressed and the patient was encouraged to contact our department should concerns arise.       KATHARINE Ennis, [facility]  Licensed Audiologist              Length To Time In Minutes Device Was In Place: 10

## 2024-10-23 NOTE — PROGRESS NOTES
Subjective   Patient ID: Carmen Mendez is a 69 y.o. female who presents for Hearing Loss.    HPI  Patient here for evaluation of her right ear.  She has previously seen Dr. Schaefer in regards to her ear.  It has much improved, but she feels it isn't back to baseline. She still as to turn her TV up. It is getting much better.  She has a longstanding history of a left-sided hearing loss.  Per patient this was an episode of labyrinthitis when she was 11 years old.  She does get tinnitus. No vertigo, some off balance.  No previous ear surgeries.    Patient Active Problem List   Diagnosis    Mixed hyperlipidemia    Hemangioma of skin and subcutaneous tissue    Background diabetic retinopathy associated with diabetes mellitus due to underlying condition (Multi)    Chest pain, midsternal    Coronary artery disease involving native coronary artery of native heart without angina pectoris    Cubital tunnel syndrome on left    Adhesive capsulitis of left shoulder    ASNHL (asymmetrical sensorineural hearing loss)    Decreased range of motion of left elbow    Hemorrhoids, external    Left carpal tunnel syndrome    Lung nodule    Exudative age-related macular degeneration of left eye    Melanocytic nevi of trunk    Menopause ovarian failure    Multiple nevi    Neoplasm of unspecified behavior of bone, soft tissue, and skin    Neuropathy of foot    Nonscarring hair loss, unspecified    Osteoarthritis of knee    Other benign neoplasm of skin of right upper limb, including shoulder    Other melanin hyperpigmentation    Seborrheic dermatitis, unspecified    Peripheral sensory neuropathy due to type 2 diabetes mellitus (Multi)    Personal history of other malignant neoplasm of skin    Rhinitis, chronic    Rotator cuff arthropathy of left shoulder    Solar keratosis    Telogen effluvium    Trigger thumb of left hand    Xerosis cutis    Injection site reaction    Presence of left artificial shoulder joint    Presence of artificial knee  joint, bilateral    Unspecified glaucoma    Essential (primary) hypertension    Muscle weakness (generalized)    Laceration of finger without foreign body without damage to nail    Advance care planning    Anemia    Type 2 diabetes mellitus, without long-term current use of insulin (Multi)    Excessive vitamin intake    Hematochezia    Inguinal pain    Injury of toe    Petechiae    Thumb pain    Diabetic neuropathy (Multi)     Past Surgical History:   Procedure Laterality Date    CARPAL TUNNEL RELEASE  2014    Neuroplasty Decompression Median Nerve At Carpal Tunnel    CATARACT EXTRACTION Bilateral      SECTION, CLASSIC  2014     Section    KNEE SURGERY  2014    Knee Surgery    OTHER SURGICAL HISTORY  10/01/2019    Femur fracture repair    OTHER SURGICAL HISTORY  10/01/2019    Humerus fracture repair    OTHER SURGICAL HISTORY  2019    Colonoscopy    OTHER SURGICAL HISTORY  2019    Tonsillectomy    OTHER SURGICAL HISTORY  2019    Shoulder surgery     Review of Systems    All other systems have been reviewed and are negative for complaints except for those mentioned in history of present illness, past medical history and problem list.    Objective   Physical Exam    Constitutional: No fever, chills, weight loss or weight gain  General appearance: Appears well, well-nourished, well groomed. No acute distress.    Communication: Normal communication    Psychiatric: Oriented to person, place and time. Normal mood and affect.    Neurologic: Cranial nerves II-XII grossly intact and symmetric bilaterally.    Head and Face:  Head: Atraumatic with no masses, lesions or scarring.  Face: Normal symmetry. No scars or deformities.  TMJ: Normal, no trismus.    Eyes: Conjunctiva not edematous or erythematous.     Right Ear: External inspection of ear with no deformity, scars, or masses. EAC is clear.  TM is intact with no sign of infection, effusion, or retraction.  No perforation  seen.     Left Ear: External inspection of ear with no deformity, scars, or masses. EAC is clear.  TM is intact with no sign of infection, effusion, or retraction.  No perforation seen.     Thompson midline.  AC > BC bilaterally.    Nose: External inspection of nose: No nasal lesions, lacerations or scars. Anterior rhinoscopy with limited visualization past the inferior turbinates. No tenderness on frontal or maxillary sinus palpation.    Oral Cavity/Mouth: Oral cavity and oropharynx mucosa moist and pink. No lesions or masses. Tonsils appear surgically removed. Uvula is midline. Tongue with no masses or lesions. Tongue with good mobility. The oropharynx is clear.    Neck: Normal appearing, symmetric, trachea midline.     Cardiovascular: Examination of peripheral vascular system shows no clubbing or cyanosis.    Respiratory: No respiratory distress increased work of breathing. Inspection of the chest with symmetric chest expansion and normal respiratory effort.    Skin: No head and neck rashes.    Lymph nodes: No adenopathy.     Diagnostic Results       Assessment/Plan   Diagnoses and all orders for this visit:  Asymmetric SNHL (sensorineural hearing loss)  Tinnitus of both ears  Reassurance given to patient.  Audiogram reviewed.  I explained to patient that her right-sided hearing is within normal range aside from some high-frequency sensorineural hearing loss.  There is no evidence of eustachian tube dysfunction noted on exam or audiogram today.  We did discuss obtaining imaging due to the asymmetric sensorineural hearing loss; however, patient has had it like this her whole life so she would not like to pursue at this time.  I recommend repeating audiogram annually to monitor hearing.  All questions answered to patient's satisfaction.    This note was created using speech recognition transcription software. Despite proofreading, several typographical errors might be present that might affect the meaning of the  content. Please call with any questions.      Trinity Agee, RIVKA-CNP 10/23/24 4:14 PM

## 2024-10-28 ENCOUNTER — PATIENT MESSAGE (OUTPATIENT)
Dept: PRIMARY CARE | Facility: CLINIC | Age: 69
End: 2024-10-28
Payer: COMMERCIAL

## 2024-10-28 DIAGNOSIS — E11.9 TYPE 2 DIABETES MELLITUS WITHOUT COMPLICATION, WITHOUT LONG-TERM CURRENT USE OF INSULIN (MULTI): ICD-10-CM

## 2024-10-28 RX ORDER — GLIMEPIRIDE 1 MG/1
1 TABLET ORAL
Qty: 90 TABLET | Refills: 1 | Status: SHIPPED | OUTPATIENT
Start: 2024-10-28

## 2024-10-31 ENCOUNTER — OFFICE VISIT (OUTPATIENT)
Dept: ORTHOPEDIC SURGERY | Facility: CLINIC | Age: 69
End: 2024-10-31
Payer: MEDICARE

## 2024-10-31 ENCOUNTER — HOSPITAL ENCOUNTER (OUTPATIENT)
Dept: RADIOLOGY | Facility: CLINIC | Age: 69
Discharge: HOME | End: 2024-10-31
Payer: MEDICARE

## 2024-10-31 VITALS — BODY MASS INDEX: 30.13 KG/M2 | HEIGHT: 67 IN | WEIGHT: 192 LBS

## 2024-10-31 DIAGNOSIS — M25.551 RIGHT HIP PAIN: ICD-10-CM

## 2024-10-31 DIAGNOSIS — S42.202S CLOSED FRACTURE OF PROXIMAL END OF LEFT HUMERUS, UNSPECIFIED FRACTURE MORPHOLOGY, SEQUELA: ICD-10-CM

## 2024-10-31 PROCEDURE — 3048F LDL-C <100 MG/DL: CPT | Performed by: ORTHOPAEDIC SURGERY

## 2024-10-31 PROCEDURE — 3008F BODY MASS INDEX DOCD: CPT | Performed by: ORTHOPAEDIC SURGERY

## 2024-10-31 PROCEDURE — 73502 X-RAY EXAM HIP UNI 2-3 VIEWS: CPT | Mod: RT

## 2024-10-31 PROCEDURE — 3044F HG A1C LEVEL LT 7.0%: CPT | Performed by: ORTHOPAEDIC SURGERY

## 2024-10-31 PROCEDURE — 4010F ACE/ARB THERAPY RXD/TAKEN: CPT | Performed by: ORTHOPAEDIC SURGERY

## 2024-10-31 PROCEDURE — 1123F ACP DISCUSS/DSCN MKR DOCD: CPT | Performed by: ORTHOPAEDIC SURGERY

## 2024-10-31 PROCEDURE — 1159F MED LIST DOCD IN RCRD: CPT | Performed by: ORTHOPAEDIC SURGERY

## 2024-10-31 PROCEDURE — 99213 OFFICE O/P EST LOW 20 MIN: CPT | Performed by: ORTHOPAEDIC SURGERY

## 2024-10-31 PROCEDURE — 73060 X-RAY EXAM OF HUMERUS: CPT | Mod: LT

## 2024-10-31 PROCEDURE — 1036F TOBACCO NON-USER: CPT | Performed by: ORTHOPAEDIC SURGERY

## 2024-10-31 ASSESSMENT — PAIN SCALES - GENERAL: PAINLEVEL_OUTOF10: 5 - MODERATE PAIN

## 2024-10-31 ASSESSMENT — PAIN - FUNCTIONAL ASSESSMENT: PAIN_FUNCTIONAL_ASSESSMENT: 0-10

## 2024-10-31 ASSESSMENT — PAIN DESCRIPTION - DESCRIPTORS: DESCRIPTORS: ACHING;SORE;SHARP

## 2024-11-14 ENCOUNTER — HOSPITAL ENCOUNTER (OUTPATIENT)
Dept: RADIOLOGY | Facility: CLINIC | Age: 69
Discharge: HOME | End: 2024-11-14
Payer: MEDICARE

## 2024-11-14 ENCOUNTER — OFFICE VISIT (OUTPATIENT)
Dept: URGENT CARE | Age: 69
End: 2024-11-14
Payer: MEDICARE

## 2024-11-14 VITALS
DIASTOLIC BLOOD PRESSURE: 83 MMHG | OXYGEN SATURATION: 99 % | SYSTOLIC BLOOD PRESSURE: 126 MMHG | HEART RATE: 71 BPM | RESPIRATION RATE: 20 BRPM | TEMPERATURE: 97.5 F

## 2024-11-14 DIAGNOSIS — M25.551 HIP PAIN, RIGHT: ICD-10-CM
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
## 2024-11-14 DIAGNOSIS — M25.551 HIP PAIN, RIGHT: Primary | ICD-10-CM

## 2024-11-14 PROCEDURE — 72220 X-RAY EXAM SACRUM TAILBONE: CPT | Mod: RIGHT SIDE

## 2024-11-14 PROCEDURE — 73502 X-RAY EXAM HIP UNI 2-3 VIEWS: CPT | Mod: RIGHT SIDE

## 2024-11-14 PROCEDURE — 72220 X-RAY EXAM SACRUM TAILBONE: CPT

## 2024-11-14 PROCEDURE — 73502 X-RAY EXAM HIP UNI 2-3 VIEWS: CPT | Mod: RT

## 2024-11-14 ASSESSMENT — ENCOUNTER SYMPTOMS
GASTROINTESTINAL NEGATIVE: 1
CARDIOVASCULAR NEGATIVE: 1
CONSTITUTIONAL NEGATIVE: 1
ARTHRALGIAS: 1

## 2024-11-14 NOTE — PROGRESS NOTES
Subjective   Patient ID: Carmen Mendez is a 69 y.o. female. They present today with a chief complaint of Fall (Pt fell Tuesday - pt c/o pubic/side pain w bruising. Pt broke hip in 2019).    History of Present Illness    Trauma  Mechanism of injury: Fall     Patient presents to the urgent care for a chief complaint of right hip pain and sacral pain after sustaining fall on Tuesday.  Patient denies hitting her head or losing consciousness, patient concerned as does have a history of right hip fracture and surgical hardware.  Patient is able to ambulate and bear weight no report of loss of bowel or bladder function no saddle anesthesia  Past Medical History  Allergies as of 11/14/2024 - Reviewed 11/14/2024   Allergen Reaction Noted    Hydrocodone-acetaminophen Hives 04/05/2019    Oxycodone Hives 04/05/2019    Tramadol Hives 04/05/2019    Cefdinir Diarrhea 06/01/2023    Codeine Hives and Nausea/vomiting 07/08/2013    Thiopental Other 04/05/2019    Clindamycin Diarrhea, GI intolerance, and GI Upset 02/28/2024    Sutures Other 04/05/2019       (Not in a hospital admission)       Past Medical History:   Diagnosis Date    Actinic keratosis 10/17/2014    Solar keratosis    Asymptomatic menopausal state 04/16/2022    Menopause    Displaced spiral fracture of shaft of humerus, left arm, sequela 08/10/2022    Closed displaced spiral fracture of shaft of left humerus, sequela    Essential (primary) hypertension 11/16/2013    Benign essential hypertension    Essential (primary) hypertension 10/24/2022    Hypertension    Exudative age-related macular degeneration, left eye, stage unspecified 12/10/2019    Exudative age-related macular degeneration of left eye, unspecified stage    History of bad fall     History of ear infections as a child     History of hearing loss     History of tonsillectomy     Melanocytic nevi, unspecified 09/20/2019    Multiple nevi    Osteoarthritis of knee, unspecified 12/10/2018    Osteoarthritis of  knee    Other conditions influencing health status 2014    Diabetes Mellitus Poorly Controlled    Personal history of coronary artery disease     Personal history of other diseases of the musculoskeletal system and connective tissue     History of arthritis    Personal history of other diseases of the nervous system and sense organs     History of glaucoma    Personal history of other endocrine, nutritional and metabolic disease     History of type 2 diabetes mellitus    Personal history of other specified conditions 2015    History of headache    Pure hypercholesterolemia, unspecified     High cholesterol    Type 2 diabetes mellitus with diabetic polyneuropathy 2021    Peripheral sensory neuropathy due to type 2 diabetes mellitus    Type 2 diabetes mellitus without complications (Multi) 10/14/2022    Type 2 diabetes mellitus    Type 2 diabetes mellitus without complications (Multi) 2022    DM2 (diabetes mellitus, type 2)    Unspecified macular degeneration 2019    Macular degeneration, left eye    Unspecified mononeuropathy of unspecified lower limb 2021    Neuropathy of foot, unspecified laterality       Past Surgical History:   Procedure Laterality Date    CARPAL TUNNEL RELEASE  2014    Neuroplasty Decompression Median Nerve At Carpal Tunnel    CATARACT EXTRACTION Bilateral      SECTION, CLASSIC  2014     Section    KNEE SURGERY  2014    Knee Surgery    OTHER SURGICAL HISTORY  10/01/2019    Femur fracture repair    OTHER SURGICAL HISTORY  10/01/2019    Humerus fracture repair    OTHER SURGICAL HISTORY  2019    Colonoscopy    OTHER SURGICAL HISTORY  2019    Tonsillectomy    OTHER SURGICAL HISTORY  2019    Shoulder surgery        reports that she has quit smoking. Her smoking use included cigarettes. She has never used smokeless tobacco. She reports that she does not currently use alcohol. She reports that she does not use  drugs.    Review of Systems  Review of Systems   Constitutional: Negative.    HENT: Negative.     Cardiovascular: Negative.    Gastrointestinal: Negative.    Musculoskeletal:  Positive for arthralgias.                                  Objective    Vitals:    11/14/24 1423   BP: 126/83   Pulse: 71   Resp: 20   Temp: 36.4 °C (97.5 °F)   TempSrc: Temporal   SpO2: 99%     No LMP recorded. Patient is postmenopausal.    Physical Exam  Vitals and nursing note reviewed.   Constitutional:       General: She is not in acute distress.     Appearance: Normal appearance. She is not ill-appearing, toxic-appearing or diaphoretic.   Musculoskeletal:         General: No swelling or deformity. Normal range of motion.   Skin:     Findings: Bruising present. No rash.   Neurological:      General: No focal deficit present.      Mental Status: She is alert and oriented to person, place, and time.   Psychiatric:         Mood and Affect: Mood normal.         Behavior: Behavior normal.         Procedures    Point of Care Test & Imaging Results from this visit  -Due to patient mechanism of injury along with history of fracture and surgical hardware x-ray of right hip pelvis and sacrum obtained  -Upon examination of radiographic imaging I do not see any acute fractures or dislocation presence of surgical hardware appears intact    Diagnostic study results (if any) were reviewed by Eliezer Frias PA-C.  -Impression: Hardware in the right hip appears unchanged no fractures noted in sacrum or coccyx but osseous detail is partially obscured by superimposed of fecal content    Assessment/Plan   Allergies, medications, history, and pertinent labs/EKGs/Imaging reviewed by Eliezer Frias PA-C.     Medical Decision Making  I did discuss with patient radiology interpretation which I am agreeable, if continuing pain or worsening pain patient is to follow-up with orthopedics which she already has in place.  Patient verbalized understanding is  agreeable to plan discharge emergent care A+O x 4 stable condition no signs of distress neurovascular intact    Orders and Diagnoses  Diagnoses and all orders for this visit:  Hip pain, right  -     XR hip right with pelvis when performed 2 or 3 views; Future  -     XR sacrum coccyx 2+ views; Future  Fall, initial encounter      Medical Admin Record      Patient disposition: Home    Electronically signed by Eliezer Frias PA-C  2:31 PM

## 2024-12-11 ENCOUNTER — APPOINTMENT (OUTPATIENT)
Dept: PRIMARY CARE | Facility: CLINIC | Age: 69
End: 2024-12-11
Payer: MEDICARE

## 2024-12-17 ENCOUNTER — APPOINTMENT (OUTPATIENT)
Dept: PRIMARY CARE | Facility: CLINIC | Age: 69
End: 2024-12-17
Payer: COMMERCIAL

## 2024-12-19 ENCOUNTER — APPOINTMENT (OUTPATIENT)
Dept: OTOLARYNGOLOGY | Facility: CLINIC | Age: 69
End: 2024-12-19
Payer: COMMERCIAL

## 2025-01-20 ENCOUNTER — APPOINTMENT (OUTPATIENT)
Dept: PRIMARY CARE | Facility: CLINIC | Age: 70
End: 2025-01-20
Payer: COMMERCIAL

## 2025-01-24 ENCOUNTER — APPOINTMENT (OUTPATIENT)
Dept: PRIMARY CARE | Facility: CLINIC | Age: 70
End: 2025-01-24
Payer: COMMERCIAL

## 2025-01-24 VITALS
WEIGHT: 189 LBS | HEART RATE: 84 BPM | BODY MASS INDEX: 29.6 KG/M2 | OXYGEN SATURATION: 98 % | DIASTOLIC BLOOD PRESSURE: 68 MMHG | SYSTOLIC BLOOD PRESSURE: 110 MMHG

## 2025-01-24 DIAGNOSIS — E11.9 TYPE 2 DIABETES MELLITUS WITHOUT COMPLICATION, WITHOUT LONG-TERM CURRENT USE OF INSULIN (MULTI): ICD-10-CM

## 2025-01-24 LAB — HBA1C MFR BLD: 5.9 % (ref 4.2–6.5)

## 2025-01-24 ASSESSMENT — ENCOUNTER SYMPTOMS
DEPRESSION: 0
NUMBNESS: 1

## 2025-01-24 NOTE — PROGRESS NOTES
Subjective   Patient ID: Carmen Mendez is a 69 y.o. female who presents for Diabetes.    HPI   DM: A1c today was 5.9. Endorses bilateral upper and lower extremity numbness. Follows with podiatry 4-5 times a week. Checks BS regularly. AM blood glucose . Lunch blood glucose around 95. Dinner blood glucose around 95. Follows with eye doctors.    Wet macular degeneration: Follows with retina specialist. Taking vitamins and getting injections.     HTN: Stable. Denies headaches, LH.     HLD: Stable on statin. Denies myalgias.    SocHx: Quit smoking at 27 years old. 10 pack year history of smoking. Denies alcohol use currently. Denies illicit drug use.    Review of Systems   Eyes:  Positive for visual disturbance.   Neurological:  Positive for numbness.   All other systems reviewed and are negative.      Objective   /68 (BP Location: Left arm, Patient Position: Sitting)   Pulse 84   Wt 85.7 kg (189 lb)   SpO2 98%   BMI 29.60 kg/m²     Physical Exam  Constitutional:       Appearance: Normal appearance.   HENT:      Head: Normocephalic and atraumatic.      Mouth/Throat:      Mouth: Mucous membranes are moist.   Eyes:      Extraocular Movements: Extraocular movements intact.      Pupils: Pupils are equal, round, and reactive to light.   Cardiovascular:      Rate and Rhythm: Normal rate and regular rhythm.      Pulses: Normal pulses.      Heart sounds: Normal heart sounds.   Pulmonary:      Breath sounds: Normal breath sounds.   Abdominal:      General: Abdomen is flat. Bowel sounds are normal.      Palpations: Abdomen is soft.   Musculoskeletal:      Right lower leg: No edema.      Left lower leg: No edema.   Skin:     General: Skin is warm.      Capillary Refill: Capillary refill takes 2 to 3 seconds.      Comments: Small bruise on 2nd digit of left foot.   Neurological:      General: No focal deficit present.      Mental Status: She is alert and oriented to person, place, and time.      Comments: Some  numbness of upper and lower extremities   Psychiatric:         Mood and Affect: Mood normal.         Behavior: Behavior normal.         Assessment/Plan   Diabetes Mellitus   - A1c 5.9 today, doing well  - Continue Amaryl  - POCT glycosylated hemoglobin docked device    Peripheral sensory neuropathy secondary to T2DM  - Endorses some neuropathy  - Follows with podiatry reguarly    CAD  - Stable  - On statin and ASA    HTN  - Stable  - Continue lisinopril     Wet macular degeneration of left eye  - Follows with retina specialist and ophthalmology

## 2025-02-06 ENCOUNTER — EVALUATION (OUTPATIENT)
Dept: PHYSICAL THERAPY | Facility: CLINIC | Age: 70
End: 2025-02-06
Payer: MEDICARE

## 2025-02-06 DIAGNOSIS — M25.561 RIGHT KNEE PAIN: ICD-10-CM

## 2025-02-06 DIAGNOSIS — S80.01XA CONTUSION OF RIGHT KNEE: Primary | ICD-10-CM

## 2025-02-06 PROCEDURE — 97110 THERAPEUTIC EXERCISES: CPT | Mod: GP | Performed by: PHYSICAL THERAPIST

## 2025-02-06 PROCEDURE — 97161 PT EVAL LOW COMPLEX 20 MIN: CPT | Mod: GP | Performed by: PHYSICAL THERAPIST

## 2025-02-06 NOTE — PROGRESS NOTES
Physical Therapy    Physical Therapy Evaluation    Patient Name: Carmen Mendez  MRN: [id number]  Today's Date: 2/6/2025  Time Calculation  Start Time: 0430  Stop Time: 0515  Time Calculation (min): 45 min    Problem List Items Addressed This Visit             ICD-10-CM    Contusion of right knee - Primary S80.01XA    Relevant Orders    Follow Up In Physical Therapy     Other Visit Diagnoses         Codes    Right knee pain     M25.561    Relevant Orders    Follow Up In Physical Therapy            Insurance:  Visit number: 1 of [place]  Insurance Type: Payor: MEDICARE / Plan: MEDICARE PART A AND B / Product Type: *No Product type* /   Authorization or Plan of Care date Range: 2/6/25- 5/7/25    General:  Reason for visit: R knee pain.  Hx of R TKA - followed up with surgeon  Surgery: No surgery found  Date of Last Surgery: No surgery found   Post-Op Days: No surgery found   Referred by: Sanjay Villanueva MD Next MD appt:  6 weeks PRN     Precautions:  TKA   Diabetes mellitus, type 2 , Diabetic retinopathy, History of carpal tunnel surgery of right wrist, History of shoulder surgery, HTN, Hypoglycemia, Macular degeneration , Osteoarthritis of both knees, B/L knee sx   Fall Risk: Low    Assessment:   R knee contusion sustained 1/19/25 after falling on ice.  Patient reports it felt like her patella subluxed.  She had xrays, was issued a J brace, and referred to PT.  Her symptoms are improving slowly.  She feels like her R LE is weak and giving out on her.  ROM of the R knee is also limited.  Depite having an extensive medical history, prior to this fall she was going to the gym 3 days/week and walking up to 2 miles, elliptical and 20 minutes, ascend/descending stairs reciprocally with one rail.      Clinical Presentation: Stable and/or uncomplicated characteristics    Impairments: Pain, Active ROM, Passive ROM, Strength, Balance, Activity limitations, Fall risk, Edema, Motor function/control, Joint mobility, and Decreased  functional level    Functional Limitations: Stair negotiation, Walking >   minutes, and Standing >   minutes    Recommended Treatment:  Therapeutic exercise, Manual therapy, Gait training, Home program instruction and progression, Neuromuscular re-education, Therapeutic activities, Self care and home management, Electrical stimulation, and Edema control      Plan:  Plan of care was developed with input and agreement by the patient.  1-2 x week x 6 weeks.    Rehab Potential:   Good to achieve goals.    Goals:  Short Term Goals:   - Patient to be Indep in [facility] for self management of symptoms    - Patient to report pain consistently < 3/10 in the R knee    Long Term Goals:   - LEFS: 54/80 to indicate a significant improvement in overall function.      - Patient will demo 3+/5 hip strength (all planes) to allow for correct gait and stair mechanics     - Patient will demo mild to no limitation AROM right knee to allow for correct mechanics with functional mobility.    - Patient to demonstrate gait with least restrictive device for all ADLS and does not demonstrate significant deviations that may contribute to discomfort in the future    - Patient to negotiate stairs reciprocally and descend with near equal eccentric control with use of hand rail.     - TUG without device = 15 seconds or less indicating reduced fall risk    - 5 x sit to stand < 15 seconds indicating reduced fall risk     Subjective:  CC:  Right knee, dislocated her knee cap.  DELPHINE:  slipped on ice.  DOI: 1/19/25  PAIN - Location: R knee Worst(past 24 hours): 5  Least(past 24 hours): 5  Pain Quality: aching  PAIN - Alleviating: ice,  brace;  Aggravating: bending, Standing, and Walking   MEDICAL MANAGEMENT: Referred to Physical Therapy, Radiographs, and Orthopedic specialist, Fitted with Play maker brace.  PLOF: Independent and pain free; going to gym 3 days/week.  Walking 2 miles and using elliptical for 20 minutes, reciprocal stairs.  FUNCTIONAL  "LIMITATIONS: Stair negotiation, Walking >   minutes, and Standing >   minutes   LIVING ENVIRONMENT: multi-level house  WORK: retired  EXERCISE:  regularly exercises.  Patient Stated Goal: alleviate pain, restore function, and walk    Medical History Form: Reviewed (scanned into chart)    Objective:     -GAIT: Independent. Ambulates with a knee brace Mildly antalgic  -STAIRS:  Ascends step too and descends step too with the use of one rail.    -SENSATION: Impaired BLE due to neuropathy.    PALPATION:   Carmen's right knee palpation exam findings consist of + medial joint line tenderness, + patellar tendon tenderness, and + tenderness at the quad tendon.    -RANGE OF MOTION:  Knee ROM Right: lacking 5 degrees to full extension  90 degrees flexion     STRENGTH  Manual Muscle Test Right Hip: .  Hip Flexion R: 3   Manual Muscle Test Right Knee: .  Knee Flexion R: 3  Knee Extension R: 3    SPECIAL TESTS        Special Tests for Right Knee: .  Joint line Tendernes R: positive   Pain with overpressure into extension R: positive   Pain with overpressure into flexion R: positive   Valgus stress test at 30 flexion R: negative  Varus stress test at 30 flexion R: negative    Carmen's right knee special test exam findings consist of + joint effusion, MCL stable and LCL stable.      Outcome Measures:  Other Measures  Lower Extremity Funtional Score (LEFS): 35    Treatment Performed: (\"NP\" = Not Performed)     Therapeutic Exercise:     30 minutes  Home exercise program instructed and issued.  Access Code: [email or web address]  Supine NMES to quad with SAQ, intensity 40, on / off 10/30 seconds  x 10 minutes  Biofeedback to rectus and VMO with LAQ x 10 minutes  **ACTIVITIES BELOW WERE NOT PERFORMED**     Supine Straight Leg Raises  -  2-3 sets - 10 reps  Supine Knee Extension Strengthening  -  2-3 sets - 10 reps  Seated Long Arc Quad  -  2-3 sets - 10 reps  Seated Hamstring Set  -  10 reps - 5 second hold    Manual Therapy:       " minutes      Neuromuscular Re-education:      minutes      Gait Training:            minutes      Aquatics:            minutes      Therapeutic Activity:      minutes      Modalities:       Vasopneumatic Device       minutes  Electrical Stimulation          minutes  Ultrasound            minutes  Iontophoresis                     minutes  Cold Pack            minutes  Mechanical Traction           minutes    Self Care Home Management:    minutes    Canalith Reposition:          minutes     Education:          minutes    Other:       minutes      Evaluation Complexity: Low: 15 minutes; Moderate   minutes; Complex PT Evaluation Time Entry: 15;  minutes    Re-Evaluation:   minutes     PT Evaluation Time Entry  PT Evaluation (Low) Time Entry: 15   PT Therapeutic Procedures Time Entry  Therapeutic Exercise Time Entry: 30

## 2025-02-06 NOTE — LETTER
February 6, 2025    Zeyad Carcamo, PT  [address] [facility] [address]  [facility] [address]    Patient: Carmen Mendez   YOB: 1955   Date of Visit: 2/6/2025       Dear Sanjay Villanueva MD  [address] [facility], [facility] [address]  [place] [address]    The attached plan of care is being sent to you because your patient’s medical reimbursement requires that you certify the plan of care. Your signature is required to allow uninterrupted insurance coverage.      You may indicate your approval by signing below and faxing this form back to us at Dept Fax: [phone].    Please call Dept: [phone] with any questions or concerns.    Thank you for this referral,        Zeyad Carcamo, PT  [address] [facility] [address]  [address] [facility] [address]    Payer: Payor: MEDICARE / Plan: MEDICARE PART A AND B / Product Type: *No Product type* /                                                                         Date:     Dear Zeyad Carcamo PT,     Re: Ms. Carmen Mendez, MRN:[id number]    I certify that I have reviewed the attached plan of care and it is medically necessary for Ms. Carmen Mendez (1955) who is under my care.          ______________________________________                    _________________  Provider name and credentials                                           Date and time                                                                                           Plan of Care 2/6/25   Effective from: 2/6/2025  Effective to: 5/7/2025    Plan ID: [id number]            Participants as of Finalize on 2/6/2025    Name Type Comments Contact Info    Sanjay Villanueva MD Referring Provider  [phone]    Zeyad Carcamo PT Physical Therapist  [phone]       Last Plan Note     Author: Zeyad Carcamo PT Status: Incomplete Last edited: 2/6/2025  4:30 PM       Physical Therapy    Physical Therapy Evaluation    Patient Name:  Carmen Mendez  MRN: [id number]  Today's Date: 2/6/2025  Time Calculation  Start Time: 0430  Stop Time: 0515  Time Calculation (min): 45 min    Problem List Items Addressed This Visit             ICD-10-CM    Contusion of right knee - Primary S80.01XA    Relevant Orders    Follow Up In Physical Therapy     Other Visit Diagnoses         Codes    Right knee pain     M25.561    Relevant Orders    Follow Up In Physical Therapy            Insurance:  Visit number: 1 of [place]  Insurance Type: Payor: MEDICARE / Plan: MEDICARE PART A AND B / Product Type: *No Product type* /   Authorization or Plan of Care date Range: 2/6/25 + 90 days.    General:  Reason for visit: R knee pain.  Hx of R TKA - followed up with surgeon  Surgery: No surgery found  Date of Last Surgery: No surgery found   Post-Op Days: No surgery found   Referred by: Sanjay Villanueva MD Next MD appt:  6 weeks PRN     Precautions:  TKA   Diabetes mellitus, type 2 , Diabetic retinopathy, History of carpal tunnel surgery of right wrist, History of shoulder surgery, HTN, Hypoglycemia, Macular degeneration , Osteoarthritis of both knees, B/L knee sx   Fall Risk: Low    Assessment:   R knee contusion sustained 1/19/25 after falling on ice.  Patient reports it felt like her patella subluxed.  She had xrays, was issued a J brace, and referred to PT.  Her symptoms are improving slowly.  She feels like her R LE is weak and giving out on her.  ROM of the R knee is also limited.  Depite having an extensive medical history, prior to this fall she was going to the gym 3 days/week and walking up to 2 miles, elliptical and 20 minutes, ascend/descending stairs reciprocally with one rail.      Clinical Presentation: Stable and/or uncomplicated characteristics    Impairments: Pain, Active ROM, Passive ROM, Strength, Balance, Activity limitations, Fall risk, Edema, Motor function/control, Joint mobility, and Decreased functional level    Functional Limitations: Stair negotiation,  Walking >   minutes, and Standing >   minutes    Recommended Treatment:  Therapeutic exercise, Manual therapy, Gait training, Home program instruction and progression, Neuromuscular re-education, Therapeutic activities, Self care and home management, Electrical stimulation, and Edema control      Plan:  Plan of care was developed with input and agreement by the patient.  1-2 x week x 6 weeks.    Rehab Potential:   Good to achieve goals.    Goals:  Short Term Goals:   - Patient to be Indep in [facility] for self management of symptoms    - Patient to report pain consistently < 3/10 in the R knee    Long Term Goals:   - LEFS: 54/80 to indicate a significant improvement in overall function.      - Patient will demo 3+/5 hip strength (all planes) to allow for correct gait and stair mechanics     - Patient will demo mild to no limitation AROM right knee to allow for correct mechanics with functional mobility.    - Patient to demonstrate gait with least restrictive device for all ADLS and does not demonstrate significant deviations that may contribute to discomfort in the future    - Patient to negotiate stairs reciprocally and descend with near equal eccentric control with use of hand rail.     - TUG without device = 15 seconds or less indicating reduced fall risk    - 5 x sit to stand < 15 seconds indicating reduced fall risk     Subjective:  CC:  Right knee, dislocated her knee cap.  DELPHINE:  slipped on ice.  DOI: 1/19/25  PAIN - Location: R knee Worst(past 24 hours): 5  Least(past 24 hours): 5  Pain Quality: aching  PAIN - Alleviating: ice,  brace;  Aggravating: bending, Standing, and Walking   MEDICAL MANAGEMENT: Referred to Physical Therapy, Radiographs, and Orthopedic specialist, Fitted with Play maker brace.  PLOF: Independent and pain free; going to gym 3 days/week.  Walking 2 miles and using elliptical for 20 minutes, reciprocal stairs.  FUNCTIONAL LIMITATIONS: Stair negotiation, Walking >   minutes, and Standing >    "minutes   LIVING ENVIRONMENT: multi-level house  WORK: retired  EXERCISE:  regularly exercises.  Patient Stated Goal: alleviate pain, restore function, and walk    Medical History Form: Reviewed (scanned into chart)    Objective:     -GAIT: Independent. Ambulates with a knee brace Mildly antalgic  -STAIRS:  Ascends step too and descends step too with the use of one rail.    -SENSATION: Impaired BLE due to neuropathy.    PALPATION:   Carmen's right knee palpation exam findings consist of + medial joint line tenderness, + patellar tendon tenderness, and + tenderness at the quad tendon.    -RANGE OF MOTION:  Knee ROM Right: lacking 5 degrees to full extension  90 degrees flexion     STRENGTH  Manual Muscle Test Right Hip: .  Hip Flexion R: 3   Manual Muscle Test Right Knee: .  Knee Flexion R: 3  Knee Extension R: 3    SPECIAL TESTS        Special Tests for Right Knee: .  Joint line Tendernes R: positive   Pain with overpressure into extension R: positive   Pain with overpressure into flexion R: positive   Valgus stress test at 30 flexion R: negative  Varus stress test at 30 flexion R: negative    Carmen's right knee special test exam findings consist of + joint effusion, MCL stable and LCL stable.      Outcome Measures:  Other Measures  Lower Extremity Funtional Score (LEFS): 35    Treatment Performed: (\"NP\" = Not Performed)     Therapeutic Exercise:     30 minutes  Home exercise program instructed and issued.  Access Code: [email or web address]  Supine NMES to quad with SAQ, intensity 40, on / off 10/30 seconds  x 10 minutes  Biofeedback to rectus and VMO with LAQ x 10 minutes  **ACTIVITIES BELOW WERE NOT PERFORMED**     Supine Straight Leg Raises  -  2-3 sets - 10 reps  Supine Knee Extension Strengthening  -  2-3 sets - 10 reps  Seated Long Arc Quad  -  2-3 sets - 10 reps  Seated Hamstring Set  -  10 reps - 5 second hold    Manual Therapy:       minutes      Neuromuscular Re-education:      minutes      Gait Training:            " minutes      Aquatics:            minutes      Therapeutic Activity:      minutes      Modalities:       Vasopneumatic Device       minutes  Electrical Stimulation          minutes  Ultrasound            minutes  Iontophoresis                     minutes  Cold Pack            minutes  Mechanical Traction           minutes    Self Care Home Management:    minutes    Canalith Reposition:          minutes     Education:          minutes    Other:       minutes      Evaluation Complexity: Low: 15 minutes; Moderate   minutes; Complex PT Evaluation Time Entry: 15;  minutes    Re-Evaluation:   minutes     PT Evaluation Time Entry  PT Evaluation (Low) Time Entry: 15   PT Therapeutic Procedures Time Entry  Therapeutic Exercise Time Entry: 30                        Current Participants as of 2/6/2025    Name Type Comments Contact Info    Sanjay Villanueva MD Referring Provider  [phone]    Signature pending    Zeyad Carcamo, PT Physical Therapist  [phone]    Signature pending

## 2025-02-11 ENCOUNTER — TREATMENT (OUTPATIENT)
Dept: PHYSICAL THERAPY | Facility: CLINIC | Age: 70
End: 2025-02-11
Payer: MEDICARE

## 2025-02-11 DIAGNOSIS — S80.01XA CONTUSION OF RIGHT KNEE: ICD-10-CM

## 2025-02-11 DIAGNOSIS — M25.561 RIGHT KNEE PAIN: ICD-10-CM

## 2025-02-11 PROCEDURE — 97110 THERAPEUTIC EXERCISES: CPT | Mod: GP,CQ

## 2025-02-11 NOTE — PROGRESS NOTES
PHYSICAL THERAPY TREATMENT NOTE    Patient Name:  Carmen Mendez   Patient MRN: [id number]  Date: 02/11/25    Time Calculation  Start Time: 1045  Stop Time: 1125  Time Calculation (min): 40 min    Insurance:  Visit number: 2 of MN  Insurance Type: Payor: MEDICARE / Plan: MEDICARE PART A AND B / Product Type: *No Product type* /   Authorization or Plan of Care date Range: 2/6/25- 5/7/25    Therapy diagnoses:   1. Contusion of right knee  Follow Up In Physical Therapy      2. Right knee pain  Follow Up In Physical Therapy           General:  Reason for visit: R knee pain.  Hx of R TKA - followed up with surgeon  Surgery: No surgery found  Date of Last Surgery: No surgery found   Post-Op Days: No surgery found   Referred by: Sanjay Villanueva MD Next MD appt:  6 weeks PRN     Precautions:  R TKA   Diabetes mellitus, type 2 , Diabetic retinopathy, History of carpal tunnel surgery of right wrist, History of shoulder surgery, HTN, Hypoglycemia, Macular degeneration , Osteoarthritis of both knees, B/L knee sx   Fall Risk: Low    Assessment:  Education: Home exercise program instructed and issued.  Progress towards functional goals: Reduced frequency of pain. Reduced intensity of pain. Reduced pain level.  Response to interventions: Patient tolerated therapeutic interventions well and without any adverse events. Tolerated treatment session well without any increase in pain.  Justification for continued skilled care:  Improve R LE/quad strength and R LE stability.    Plan:  Exercises targeting surrounding musculature to strengthening the R quad and improve function.    Subjective:   Carmen reports she feels like her condition is improving.  Had some increased pain in the R knee a few days ago; having intermittent twinges across the knee cap as well. Taking steps non-reciprocal. Liked the biofeedback and NMES with no adverse reaction after  "last session.  Progress towards functional goal:   States she took brace off thinking maybe that was why the R leg was hurting and it is feeling better.   Pain (0-10): 0    HEP adherence / understanding: patient reports compliance with the instructed home exercises.    Treatment Performed: (\"NP\" = Not Performed)     Therapeutic Exercise:     40 minutes  Access Code: [email or web address]  Supine NMES to quad with SAQ, intensity 40, on / off 10/30 seconds  x 10 minutes  Biofeedback to rectus and VMO with LAQ x 10 minutes  Sit to stand raised plinth with UE assistance x 10  Side lying hip abd x 10  Standing hip ext x 10  **ACTIVITIES BELOW WERE NOT PERFORMED**      Supine Straight Leg Raises  -  2-3 sets - 10 reps  Supine Knee Extension Strengthening  -  2-3 sets - 10 reps  Seated Long Arc Quad  -  2-3 sets - 10 reps  Seated Hamstring Set  -  10 reps - 5 second hold    Manual Therapy:       minutes      Neuromuscular Re-education:      minutes      Gait Training:            minutes      Aquatics:            minutes      Therapeutic Activity:      minutes      Gait Training:            minutes      Modalities:       Vasopneumatic Device       minutes  Electrical Stimulation          minutes  Ultrasound            minutes  Iontophoresis                     minutes  Cold Pack            minutes  Mechanical Traction           minutes    Self Care Home Management:    minutes    Canalith Reposition:          minutes     Education:          minutes    Other:       minutes      Evaluation Complexity: Low:   minutes; Moderate   minutes; Complex   minutes    Re-Evaluation:   minutes  "

## 2025-02-18 ENCOUNTER — TREATMENT (OUTPATIENT)
Dept: PHYSICAL THERAPY | Facility: CLINIC | Age: 70
End: 2025-02-18
Payer: MEDICARE

## 2025-02-18 DIAGNOSIS — M25.561 RIGHT KNEE PAIN: ICD-10-CM

## 2025-02-18 DIAGNOSIS — S80.01XA CONTUSION OF RIGHT KNEE: ICD-10-CM

## 2025-02-18 PROCEDURE — 97110 THERAPEUTIC EXERCISES: CPT | Mod: GP,CQ

## 2025-02-18 NOTE — PROGRESS NOTES
PHYSICAL THERAPY TREATMENT NOTE    Patient Name:  Carmen Mendez   Patient MRN: [id number]  Date: 02/18/25    Time Calculation  Start Time: 1430  Stop Time: 1515  Time Calculation (min): 45 min    Insurance:  Visit number: 3 of MN  Insurance Type: Payor: MEDICARE / Plan: MEDICARE PART A AND B / Product Type: *No Product type* /   Authorization or Plan of Care date Range: 2/6/25- 5/7/25    Therapy diagnoses:   1. Contusion of right knee  Follow Up In Physical Therapy      2. Right knee pain  Follow Up In Physical Therapy          General:  Reason for visit: R knee pain.  Hx of R TKA - followed up with surgeon  Surgery: No surgery found  Date of Last Surgery: No surgery found   Post-Op Days: No surgery found   Referred by: Sanjay Villanueva MD Next MD appt:  6 weeks PRN     Precautions:  R TKA   Diabetes mellitus, type 2 , Diabetic retinopathy, History of carpal tunnel surgery of right wrist, History of shoulder surgery, HTN, Hypoglycemia, Macular degeneration , Osteoarthritis of both knees, B/L knee sx   Fall Risk: Low    Assessment:  Education: Home exercise program instructed and issued.  Progress towards functional goals: Reduced frequency of pain. Reduced intensity of pain. Reduced pain level.  Response to interventions: Progressed with additional stretches for the gastroc and hamstring to improve LE flexibility to determine if those will decrease posterior knee pain at night. Patient tolerated therapeutic interventions well and without any adverse events. Tolerated treatment session well without any increase in pain.   Justification for continued skilled care:  Improve R LE/quad strength and R LE stability.    Plan:  Exercises targeting surrounding musculature to strengthening the R quad and improve function.    Subjective:   Carmen reports she feels like her condition is improving.  Has been having more posterior R knee pain  "lately at night.  Progress towards functional goal:   No longer wearing a brace. No instances of the knee buckling recently.    Pain (0-10): 0    HEP adherence / understanding: patient reports compliance with the instructed home exercises.    Treatment Performed: (\"NP\" = Not Performed)     Therapeutic Exercise:     45 minutes  Access Code: [email or web address]  Supine NMES to quad with SAQ, intensity 40, on / off 10/30 seconds  x 10 minutes  Biofeedback to rectus and VMO with LAQ x 10 minutes  Green roller for self massage  Slant board stretch 30\" x 3  Hamstring stretch 30\" x 3  Sit to stand raised plinth with UE assistance x 10  Side lying hip abd x 10  Standing hip ext x 10  **ACTIVITIES BELOW WERE NOT PERFORMED**      Supine Straight Leg Raises  -  2-3 sets - 10 reps  Supine Knee Extension Strengthening  -  2-3 sets - 10 reps  Seated Long Arc Quad  -  2-3 sets - 10 reps  Seated Hamstring Set  -  10 reps - 5 second hold    Manual Therapy:       minutes      Neuromuscular Re-education:      minutes      Gait Training:            minutes      Aquatics:            minutes      Therapeutic Activity:      minutes      Gait Training:            minutes      Modalities:       Vasopneumatic Device       minutes  Electrical Stimulation          minutes  Ultrasound            minutes  Iontophoresis                     minutes  Cold Pack            minutes  Mechanical Traction           minutes    Self Care Home Management:    minutes    Canalith Reposition:          minutes     Education:          minutes    Other:       minutes      Evaluation Complexity: Low:   minutes; Moderate   minutes; Complex   minutes    Re-Evaluation:   minutes  "

## 2025-02-20 ENCOUNTER — TREATMENT (OUTPATIENT)
Dept: PHYSICAL THERAPY | Facility: CLINIC | Age: 70
End: 2025-02-20
Payer: MEDICARE

## 2025-02-20 DIAGNOSIS — S80.01XA CONTUSION OF RIGHT KNEE: Primary | ICD-10-CM

## 2025-02-20 DIAGNOSIS — M25.561 RIGHT KNEE PAIN: ICD-10-CM

## 2025-02-20 PROCEDURE — 97110 THERAPEUTIC EXERCISES: CPT | Mod: GP | Performed by: PHYSICAL THERAPIST

## 2025-02-20 NOTE — PROGRESS NOTES
PHYSICAL THERAPY TREATMENT NOTE    Patient Name:  Carmen Mendez   Patient MRN: [id number]  Date: 02/20/25    Time Calculation  Start Time: 0100  Stop Time: 0140  Time Calculation (min): 40 min    Insurance:  Visit number: 4 of [place]  Insurance Type: Payor: MEDICARE / Plan: MEDICARE PART A AND B / Product Type: *No Product type* /   Authorization or Plan of Care date Range: 2/6/25- 5/7/25    Therapy diagnoses:   1. Contusion of right knee  Follow Up In Physical Therapy      2. Right knee pain  Follow Up In Physical Therapy        General:  Reason for visit: R knee pain.  Hx of R TKA - followed up with surgeon  Surgery: No surgery found  Date of Last Surgery: No surgery found   Post-Op Days: No surgery found   Referred by: Sanjay Villanueva MD Next MD appt:  6 weeks PRN     Precautions:  R TKA   Diabetes mellitus, type 2 , Diabetic retinopathy, History of carpal tunnel surgery of right wrist, History of shoulder surgery, HTN, Hypoglycemia, Macular degeneration , Osteoarthritis of both knees, B/L knee sx   Fall Risk: Low    Subjective:   Carmen reports she feels like her condition is improving. Patient reports  Feeling stronger.   Pain (0-10): mild pain.    HEP adherence / understanding: patient reports compliance with the instructed home exercises.    Assessment:   Education: Reviewed home exercise program.  Progress towards functional goals:  Feeling stronger. Feeling more stable in the L knee.  Response to interventions: Patient tolerated therapeutic interventions well and without any adverse events.  Progressed to exercises as noted.  Quads were worked and fatigued but no real increase or change in pain.  Justification for continued skilled care: Progressive strengthening to stabilize the R knee musculat to improve function.    Plan:  Exercises targeting surrounding musculature to strengthen and improve function.    Objective:  "      Treatment Performed: (\"NP\" = Not Performed)     Therapeutic Exercise:     40 minutes  Access Code: [email or web address]  Seated Bilateral knee ext machine: 10# 3 x 10   Shuttle Bilateral leg press:  50# 3 x 10  Shallow Fwd lunge: with rail + chair for UE support: 5\" x 10 ea LE   Slant board stretch 30\" x 3  Hamstring stretch 30\" x 3  Isometric hamstring 5\" x 10   Sit to stands (22\" plinth) 2 x 10    Supine NMES to quad with LAQ, intensity 50, on / off 10/30 seconds  x 10 minutes  Biofeedback to rectus and VMO with LAQ x 10 minutes - NP  Green roller for self massage - NP    **ACTIVITIES BELOW WERE NOT PERFORMED**     Supine Straight Leg Raises  -  2-3 sets - 10 reps  Supine Knee Extension Strengthening  -  2-3 sets - 10 reps  Seated Long Arc Quad  -  2-3 sets - 10 reps  Seated Hamstring Set  -  10 reps - 5 second hold  Side lying hip abd x 10  Standing hip ext x 10    Manual Therapy:       minutes      Neuromuscular Re-education:      minutes      Gait Training:            minutes      Aquatics:            minutes      Therapeutic Activity:      minutes      Gait Training:            minutes      Modalities:       Vasopneumatic Device       minutes  Electrical Stimulation          minutes  Ultrasound            minutes  Iontophoresis                     minutes  Cold Pack            minutes  Mechanical Traction           minutes    Self Care Home Management:    minutes    Canalith Reposition:          minutes     Education:          minutes    Other:       minutes      Evaluation Complexity: Low:   minutes; Moderate   minutes; Complex   minutes    Re-Evaluation:   minutes  "

## 2025-02-25 ENCOUNTER — TREATMENT (OUTPATIENT)
Dept: PHYSICAL THERAPY | Facility: CLINIC | Age: 70
End: 2025-02-25
Payer: MEDICARE

## 2025-02-25 DIAGNOSIS — S80.01XA CONTUSION OF RIGHT KNEE: ICD-10-CM

## 2025-02-25 DIAGNOSIS — M25.561 RIGHT KNEE PAIN: ICD-10-CM

## 2025-02-25 PROCEDURE — 97110 THERAPEUTIC EXERCISES: CPT | Mod: GP,CQ

## 2025-02-25 NOTE — PROGRESS NOTES
PHYSICAL THERAPY TREATMENT NOTE    Patient Name:  Carmen Mendez   Patient MRN: [id number]  Date: 02/25/25    Time Calculation  Start Time: 0910  Stop Time: 0950  Time Calculation (min): 40 min    Insurance:  Visit number: 5 of [place]  Insurance Type: Payor: MEDICARE / Plan: MEDICARE PART A AND B / Product Type: *No Product type* /   Authorization or Plan of Care date Range: 2/6/25- 5/7/25    Therapy diagnoses:   1. Contusion of right knee  Follow Up In Physical Therapy      2. Right knee pain  Follow Up In Physical Therapy          General:  Reason for visit: R knee pain.  Hx of R TKA - followed up with surgeon  Surgery: No surgery found  Date of Last Surgery: No surgery found   Post-Op Days: No surgery found   Referred by: Sanjay Villanueva MD Next MD appt:  6 weeks PRN     Precautions:  R TKA   Diabetes mellitus, type 2 , Diabetic retinopathy, History of carpal tunnel surgery of right wrist, History of shoulder surgery, HTN, Hypoglycemia, Macular degeneration , Osteoarthritis of both knees, B/L knee sx   Fall Risk: Low    Subjective:   Carmen reports she feels like her condition is improving. Patient reports  she is more confident with her strength.   Pain (0-10): no pain today   HEP adherence / understanding: patient reports compliance with the instructed home exercises.    Assessment:   Education: Reviewed home exercise program.  Progress towards functional goals:  Increased R LE strength. Improved gait. Decreased frequency of pain.  Response to interventions: Patient tolerated therapeutic interventions well and without any adverse events.  Patient is progressing well with quad strength and was able to perform biofeedback with more consistent repetitions in the green range. No instances of increase of pain during session.  Justification for continued skilled care: Progressive strengthening to stabilize the R knee musculature to  "improve function.    Plan:  Exercises targeting surrounding musculature to strengthen and improve function.    Objective:       Treatment Performed: (\"NP\" = Not Performed)     Therapeutic Exercise:     40 minutes  Access Code: [email or web address]  Seated Bilateral knee ext machine: 10# 3 x 10   Shuttle Bilateral leg press: 50# 3 x 10  Shallow Fwd lunge: with rail + chair for UE support: 5\" x 10 ea LE   Slant board stretch 30\" x 3  Hamstring stretch 30\" x 3  Isometric hamstring 5\" x 10   Sit to stands (22\" plinth) 2 x 10    Supine NMES to quad with LAQ, intensity 50, on / off 10/30 seconds  x 10 minutes  Biofeedback to rectus and VMO with LAQ x 10 minutes       **ACTIVITIES BELOW WERE NOT PERFORMED**     Supine Straight Leg Raises  -  2-3 sets - 10 reps  Supine Knee Extension Strengthening  -  2-3 sets - 10 reps  Seated Long Arc Quad  -  2-3 sets - 10 reps  Seated Hamstring Set  -  10 reps - 5 second hold  Side lying hip abd x 10  Standing hip ext x 10  Green roller for self massage    Manual Therapy:       minutes      Neuromuscular Re-education:      minutes      Gait Training:            minutes      Aquatics:            minutes      Therapeutic Activity:      minutes      Gait Training:            minutes      Modalities:       Vasopneumatic Device       minutes  Electrical Stimulation          minutes  Ultrasound            minutes  Iontophoresis                     minutes  Cold Pack            minutes  Mechanical Traction           minutes    Self Care Home Management:    minutes    Canalith Reposition:          minutes     Education:          minutes    Other:       minutes      Evaluation Complexity: Low:   minutes; Moderate   minutes; Complex   minutes    Re-Evaluation:   minutes  "

## 2025-02-28 ENCOUNTER — TREATMENT (OUTPATIENT)
Dept: PHYSICAL THERAPY | Facility: CLINIC | Age: 70
End: 2025-02-28
Payer: MEDICARE

## 2025-02-28 DIAGNOSIS — S80.01XA CONTUSION OF RIGHT KNEE: Primary | ICD-10-CM

## 2025-02-28 DIAGNOSIS — M25.561 RIGHT KNEE PAIN: ICD-10-CM

## 2025-02-28 PROCEDURE — 97110 THERAPEUTIC EXERCISES: CPT | Mod: GP,CQ

## 2025-02-28 NOTE — PROGRESS NOTES
PHYSICAL THERAPY TREATMENT NOTE    Patient Name:  Carmen Mendez   Patient MRN: [id number]  Date: 02/28/25    Time Calculation  Start Time: 1430  Stop Time: 1515  Time Calculation (min): 45 min    Insurance:  Visit number: 6 of [place]  Insurance Type: Payor: MEDICARE / Plan: MEDICARE PART A AND B / Product Type: *No Product type* /   Authorization or Plan of Care date Range: 2/6/25- 5/7/25    Therapy diagnoses:   1. Contusion of right knee        2. Right knee pain          General:  Reason for visit: R knee pain.  Hx of R TKA - followed up with surgeon  Surgery: No surgery found  Date of Last Surgery: No surgery found   Post-Op Days: No surgery found   Referred by: Sanjay Villanueva MD  Next MD appt:  6 weeks PRN     Precautions:  R TKA   Diabetes mellitus, type 2 , Diabetic retinopathy, History of carpal tunnel surgery of right wrist, History of shoulder surgery, HTN, Hypoglycemia, Macular degeneration , Osteoarthritis of both knees, B/L knee sx   Fall Risk: Low    Subjective:   Carmen reports she feels like her condition is improving. Patient reports  she has no pain nor buckling of the R knee.   Pain (0-10): no pain today   HEP adherence / understanding: patient reports compliance with the instructed home exercises.    Assessment:   Education: Reviewed home exercise program.  Progress towards functional goals:  Improved R LE strength. Improved gait. Reduced frequency of pain.  Response to interventions: Patient tolerated therapeutic interventions well and without any adverse events.  Increased knee extension machine to challenge R quad strength with good tolerance. Progressed with resisted cable column ambulation in fwd and retro for balance and proprioception with SBA. Patient able to increase target zone for biofeedback with mild muscular fatigue.  Justification for continued skilled care: Progressive strengthening to stabilize the  "R knee musculature to improve function.    Plan:  Exercises targeting surrounding musculature to strengthen and improve function.    Objective:       Treatment Performed: (\"NP\" = Not Performed)     Therapeutic Exercise:     45 minutes  Access Code: [email or web address]  Seated Bilateral knee ext machine: 15# 2 x 10   Shuttle Bilateral leg press: 50# 3 x 10  Shallow Fwd lunge: with rail + chair for UE support: 5\" x 10 ea LE   Slant board stretch 30\" x 3  Hamstring stretch 30\" x 3  Cable column ambulation fwd and retro 5x ea 10#  Isometric hamstring 5\" x 10   Sit to stands (22\" plinth) 2 x 10    Supine NMES to quad with LAQ, intensity 50, on / off 10/30 seconds  x 10 minutes  Biofeedback to rectus and VMO with LAQ x 10 minutes ( target 470)      **ACTIVITIES BELOW WERE NOT PERFORMED**     Supine Straight Leg Raises  -  2-3 sets - 10 reps  Supine Knee Extension Strengthening  -  2-3 sets - 10 reps  Seated Long Arc Quad  -  2-3 sets - 10 reps  Seated Hamstring Set  -  10 reps - 5 second hold  Side lying hip abd x 10  Standing hip ext x 10  Green roller for self massage    Manual Therapy:       minutes      Neuromuscular Re-education:      minutes      Gait Training:            minutes      Aquatics:            minutes      Therapeutic Activity:      minutes      Gait Training:            minutes      Modalities:       Vasopneumatic Device       minutes  Electrical Stimulation          minutes  Ultrasound            minutes  Iontophoresis                     minutes  Cold Pack            minutes  Mechanical Traction           minutes    Self Care Home Management:    minutes    Canalith Reposition:          minutes     Education:          minutes    Other:       minutes      Evaluation Complexity: Low:   minutes; Moderate   minutes; Complex   minutes    Re-Evaluation:   minutes  "

## 2025-03-04 ENCOUNTER — TREATMENT (OUTPATIENT)
Dept: PHYSICAL THERAPY | Facility: CLINIC | Age: 70
End: 2025-03-04
Payer: MEDICARE

## 2025-03-04 DIAGNOSIS — S80.01XA CONTUSION OF RIGHT KNEE: Primary | ICD-10-CM

## 2025-03-04 DIAGNOSIS — M25.561 RIGHT KNEE PAIN: ICD-10-CM

## 2025-03-04 PROCEDURE — 97110 THERAPEUTIC EXERCISES: CPT | Mod: GP | Performed by: PHYSICAL THERAPIST

## 2025-03-04 NOTE — PROGRESS NOTES
PHYSICAL THERAPY TREATMENT NOTE    Patient Name:  Carmen Mendez   Patient MRN: [id number]  Date: 03/04/25    Time Calculation  Start Time: 0955  Stop Time: 1045  Time Calculation (min): 50 min    Insurance:  Visit number: 7 of [place]  Insurance Type: Payor: MEDICARE / Plan: MEDICARE PART A AND B / Product Type: *No Product type* /   Authorization or Plan of Care date Range: 2/6/25- 5/7/25    Therapy diagnoses:   1. Contusion of right knee  Follow Up In Physical Therapy      2. Right knee pain  Follow Up In Physical Therapy          General:  Reason for visit: R knee pain.  R knee contusion sustained 1/19/25 after falling on ice.  R LE is weak and giving out on her; Hx of R TKA - followed up with surgeon  Surgery: No surgery found  Date of Last Surgery: No surgery found   Post-Op Days: No surgery found   Referred by: Sanjay Villanueva MD Next MD appt:  6 weeks PRN     Precautions:  R TKA   Diabetes mellitus, type 2 , Diabetic retinopathy, History of carpal tunnel surgery of right wrist, History of shoulder surgery, HTN, Hypoglycemia, Macular degeneration , Osteoarthritis of both knees, B/L knee sx   Fall Risk: Low    Subjective:   Carmen reports she feels like her condition is improving. Denies instability in the R knee.  Patient reports  Knee is more stable.   Pain (0-10): 0    HEP adherence / understanding: patient reports compliance with the instructed home exercises.  She may return to gym tomorrow.    Assessment:   Education: Reviewed home exercise program.  Progress towards functional goals: Improved gait quality. Improved ability to negotiate stairs. Improved walking distance. Improved balance.  Response to interventions: Patient tolerated therapeutic interventions well and without any adverse events.  Justification for continued skilled care: Progressive strengthening to stabilize the R LE to improve function.    Plan:  Exercises  "targeting surrounding musculature to strengthening and improve function.    Objective:     Treatment Performed: (\"NP\" = Not Performed)     Therapeutic Exercise:     50 minutes  Access Code: [email or web address]  Seated Bilateral knee ext machine: 15# 3 x 10   Shuttle Bilateral leg press: 50# 3 x 15  Shallow Fwd lunge: with rail + chair for UE support: 5\" x 10 ea LE   Isometric hamstring 5\" x 10   Sit to stands 22\" x 10, 20\"  2 x 10    Supine NMES to quad with LAQ, intensity 50, on / off 10/30 seconds  x 10 minutes  Biofeedback to rectus and VMO with LAQ x 10 minutes ( target 470)      **ACTIVITIES BELOW WERE NOT PERFORMED**   Cable column ambulation fwd and retro 5x ea 10#  Slant board stretch 30\" x 3  Hamstring stretch 30\" x 3  Supine Straight Leg Raises  -  2-3 sets - 10 reps  Supine Knee Extension Strengthening  -  2-3 sets - 10 reps  Seated Long Arc Quad  -  2-3 sets - 10 reps  Seated Hamstring Set  -  10 reps - 5 second hold  Side lying hip abd x 10  Standing hip ext x 10  Green roller for self massage    Manual Therapy:       minutes      Neuromuscular Re-education:      minutes      Gait Training:            minutes      Aquatics:            minutes      Therapeutic Activity:      minutes      Gait Training:            minutes      Modalities:       Vasopneumatic Device       minutes  Electrical Stimulation          minutes  Ultrasound            minutes  Iontophoresis                     minutes  Cold Pack            minutes  Mechanical Traction           minutes    Self Care Home Management:    minutes    Canalith Reposition:          minutes     Education:          minutes    Other:       minutes      Evaluation Complexity: Low:   minutes; Moderate   minutes; Complex   minutes    Re-Evaluation:   minutes  "

## 2025-03-06 NOTE — PROGRESS NOTES
PHYSICAL THERAPY TREATMENT NOTE    Patient Name:  Carmen Mendez   Patient MRN: [id number]  Date: 03/07/25    Time Calculation  Start Time: 1045  Stop Time: 1130  Time Calculation (min): 45 min    Insurance:  Visit number: 8 of [place]  Insurance Type: Payor: MEDICARE / Plan: MEDICARE PART A AND B / Product Type: *No Product type* /   Authorization or Plan of Care date Range: 2/6/25- 5/7/25    Therapy diagnoses:   1. Contusion of right knee  Follow Up In Physical Therapy      2. Right knee pain  Follow Up In Physical Therapy        General:  Reason for visit: R knee pain.  R knee contusion sustained 1/19/25 after falling on ice.  R LE is weak and giving out on her; Hx of R TKA - followed up with surgeon  Surgery: No surgery found  Date of Last Surgery: No surgery found   Post-Op Days: No surgery found   Referred by: Sanjay Villanueva MD Next MD appt:  6 weeks PRN     Precautions:  R TKA   Diabetes mellitus, type 2 , Diabetic retinopathy, History of carpal tunnel surgery of right wrist, History of shoulder surgery, HTN, Hypoglycemia, Macular degeneration , Osteoarthritis of both knees, B/L knee sx   Fall Risk: Low    Subjective:    Doing well, returned to gym and walked 1 mile in 30 minutes.   Knee is feeling stable.   Pain (0-10): 0    HEP adherence / understanding: patient reports compliance with the instructed home exercises.    Assessment:   Education:  she is going to try 5-10 minutes on the elliptical at the gym today.  Will plan to start gradually and progress if able.  Progress towards functional goals:  improved stability, knee not giving out, and no or greatly reduce pain.  Response to interventions: Patient tolerated therapeutic interventions well and without any adverse events.  Justification for continued skilled care: To address remaining functional, objective and subjective deficits to allow the patient to return to full  "independence with ADLs.    Plan:  Exercises targeting surrounding musculature to strengthen and improve function.    Objective:       Treatment Performed: (\"NP\" = Not Performed)     Therapeutic Exercise:     45 minutes  Access Code: [email or web address]  Seated Bilateral knee ext machine: 15# 3 x 10   Shuttle Bilateral leg press: 50# 3 x 15  Shallow Fwd lunge: with rail + chair for UE support: 5\" x 10 ea LE   Isometric hamstring 5\" x 10   Sit to stands 22\" x 10, 20\"  2 x 10    Seated NMES to quad with LAQ, intensity 50, on / off 10/30 seconds  x 10 minutes  Biofeedback to rectus and VMO with LAQ x 10 minutes ( target 470)      **ACTIVITIES BELOW WERE NOT PERFORMED**   Cable column ambulation fwd and retro 5x ea 10#  Slant board stretch 30\" x 3  Hamstring stretch 30\" x 3  Supine Straight Leg Raises  -  2-3 sets - 10 reps  Supine Knee Extension Strengthening  -  2-3 sets - 10 reps  Seated Long Arc Quad  -  2-3 sets - 10 reps  Seated Hamstring Set  -  10 reps - 5 second hold  Side lying hip abd x 10  Standing hip ext x 10  Green roller for self massage    Manual Therapy:       minutes      Neuromuscular Re-education:      minutes      Gait Training:            minutes      Aquatics:            minutes      Therapeutic Activity:      minutes      Gait Training:            minutes      Modalities:       Vasopneumatic Device       minutes  Electrical Stimulation          minutes  Ultrasound            minutes  Iontophoresis                     minutes  Cold Pack            minutes  Mechanical Traction           minutes    Self Care Home Management:    minutes    Canalith Reposition:          minutes     Education:          minutes    Other:       minutes      Evaluation Complexity: Low:   minutes; Moderate   minutes; Complex   minutes    Re-Evaluation:   minutes  "

## 2025-03-07 ENCOUNTER — TREATMENT (OUTPATIENT)
Dept: PHYSICAL THERAPY | Facility: CLINIC | Age: 70
End: 2025-03-07
Payer: MEDICARE

## 2025-03-07 DIAGNOSIS — M25.561 RIGHT KNEE PAIN: ICD-10-CM

## 2025-03-07 DIAGNOSIS — S80.01XA CONTUSION OF RIGHT KNEE: Primary | ICD-10-CM

## 2025-03-07 PROCEDURE — 97110 THERAPEUTIC EXERCISES: CPT | Mod: GP | Performed by: PHYSICAL THERAPIST

## 2025-03-11 ENCOUNTER — TREATMENT (OUTPATIENT)
Dept: PHYSICAL THERAPY | Facility: CLINIC | Age: 70
End: 2025-03-11
Payer: MEDICARE

## 2025-03-11 DIAGNOSIS — S80.01XA CONTUSION OF RIGHT KNEE: Primary | ICD-10-CM

## 2025-03-11 DIAGNOSIS — M25.561 RIGHT KNEE PAIN: ICD-10-CM

## 2025-03-11 PROCEDURE — 97110 THERAPEUTIC EXERCISES: CPT | Mod: GP | Performed by: PHYSICAL THERAPIST

## 2025-03-11 NOTE — PROGRESS NOTES
PHYSICAL THERAPY TREATMENT NOTE    Patient Name:  Carmen Mendez   Patient MRN: [id number]  Date: 03/11/25    Time Calculation  Start Time: 1045  Stop Time: 1112  Time Calculation (min): 27 min    Insurance:  Visit number: 9 of [place]  Insurance Type: Payor: MEDICARE / Plan: MEDICARE PART A AND B / Product Type: *No Product type* /   Authorization or Plan of Care date Range: 2/6/25- 5/7/25    Therapy diagnoses:   1. Contusion of right knee  Follow Up In Physical Therapy      2. Right knee pain  Follow Up In Physical Therapy          General:  Reason for visit: R knee pain.  R knee contusion sustained 1/19/25 after falling on ice.  R LE is weak and giving out on her; Hx of R TKA - followed up with surgeon  Surgery: No surgery found  Date of Last Surgery: No surgery found   Post-Op Days: No surgery found   Referred by: Sanjay Villanueva MD Next MD appt:  6 weeks PRN     Precautions:  R TKA   Diabetes mellitus, type 2 , Diabetic retinopathy, History of carpal tunnel surgery of right wrist, History of shoulder surgery, HTN, Hypoglycemia, Macular degeneration , Osteoarthritis of both knees, B/L knee sx   Fall Risk: Low    Subjective:   Carmen reports she feels like her condition is improving. Patient reports  has been to gym the last 4 consecutive days.  Walking 1 mile.   Pain (0-10): Knee pain 1/10    HEP adherence / understanding: patient reports compliance with the instructed home exercises.    Assessment:   Education: Reviewed home exercise program.  Progress towards functional goals:  Goals MET  Response to interventions: Patient tolerated therapeutic interventions well and without any adverse events.    Plan:  Discharge from physical therapy.    Objective:   Goals:  Short Term Goals:   - Patient to be Indep in HEP for self management of symptoms  3/11/25: MET      - Patient to report pain consistently < 3/10 in the R knee  3/11/25: MET  "- Pain is abolished.     Long Term Goals:   - LEFS: 54/80 to indicate a significant improvement in overall function.   3/11/25: MET 62      - Patient will demo 3+/5 hip strength (all planes) to allow for correct gait and stair mechanics   3/11/25: MET     - Patient will demo mild to no limitation AROM right knee to allow for correct mechanics with functional mobility.     - Patient to demonstrate gait with least restrictive device for all ADLS and does not demonstrate significant deviations that may contribute to discomfort in the future  3/11/25: INDEP  MET     - Patient to negotiate stairs reciprocally and descend with near equal eccentric control with use of hand rail.    3/11/25: Progressing.  Descends sideways/backwards due to L knee adhesions.  Ascends reciprocally.  Always uses one rail     - TUG without device = 15 seconds or less indicating reduced fall risk  3/11/25: MET 9 seconds     - 5 x sit to stand < 15 seconds indicating reduced fall risk   3/11/25: Unable from a standard chair.  But with chair + Airex 12 seconds.    Treatment Performed: (\"NP\" = Not Performed)     Therapeutic Exercise:     27 minutes  Access Code: [email or web address]  Seated Bilateral knee ext machine: 15# 3 x 10   Shuttle Bilateral leg press: 50# 3 x 15  Shallow Fwd lunge: with rail + chair for UE support: 5\" x 10 ea LE   Isometric hamstring 5\" x 10     **ACTIVITIES BELOW WERE NOT PERFORMED**   Sit to stands 22\" x 10, 20\"  2 x 10    Seated NMES to quad with LAQ, intensity 50, on / off 10/30 seconds  x 10 minutes  Biofeedback to rectus and VMO with LAQ x 10 minutes ( target 470)  Cable column ambulation fwd and retro 5x ea 10#  Slant board stretch 30\" x 3  Hamstring stretch 30\" x 3  Supine Straight Leg Raises  -  2-3 sets - 10 reps  Supine Knee Extension Strengthening  -  2-3 sets - 10 reps  Seated Long Arc Quad  -  2-3 sets - 10 reps  Seated Hamstring Set  -  10 reps - 5 second hold  Side lying hip abd x 10  Standing hip ext x 10  Green roller " for self massage    Manual Therapy:       minutes      Neuromuscular Re-education:      minutes      Gait Training:            minutes      Aquatics:            minutes      Therapeutic Activity:      minutes      Gait Training:            minutes      Modalities:       Vasopneumatic Device       minutes  Electrical Stimulation          minutes  Ultrasound            minutes  Iontophoresis                     minutes  Cold Pack            minutes  Mechanical Traction           minutes    Self Care Home Management:    minutes    Canalith Reposition:          minutes     Education:          minutes    Other:       minutes      Evaluation Complexity: Low:   minutes; Moderate   minutes; Complex   minutes    Re-Evaluation:   minutes

## 2025-03-14 ENCOUNTER — APPOINTMENT (OUTPATIENT)
Dept: PHYSICAL THERAPY | Facility: CLINIC | Age: 70
End: 2025-03-14
Payer: MEDICARE

## 2025-03-18 ENCOUNTER — APPOINTMENT (OUTPATIENT)
Dept: PHYSICAL THERAPY | Facility: CLINIC | Age: 70
End: 2025-03-18
Payer: MEDICARE

## 2025-03-21 ENCOUNTER — APPOINTMENT (OUTPATIENT)
Dept: PHYSICAL THERAPY | Facility: CLINIC | Age: 70
End: 2025-03-21
Payer: MEDICARE

## 2025-03-25 ENCOUNTER — APPOINTMENT (OUTPATIENT)
Dept: DERMATOLOGY | Facility: CLINIC | Age: 70
End: 2025-03-25
Payer: COMMERCIAL

## 2025-03-25 DIAGNOSIS — L30.9 HAND DERMATITIS: ICD-10-CM

## 2025-03-25 DIAGNOSIS — L81.4 LENTIGO: ICD-10-CM

## 2025-03-25 DIAGNOSIS — D22.9 NEVUS: Primary | ICD-10-CM

## 2025-03-25 DIAGNOSIS — Z12.83 SCREENING EXAM FOR SKIN CANCER: ICD-10-CM

## 2025-03-25 DIAGNOSIS — L82.1 SEBORRHEIC KERATOSIS: ICD-10-CM

## 2025-03-25 DIAGNOSIS — Z86.018 HISTORY OF DYSPLASTIC NEVUS: ICD-10-CM

## 2025-03-25 PROCEDURE — 1123F ACP DISCUSS/DSCN MKR DOCD: CPT | Performed by: NURSE PRACTITIONER

## 2025-03-25 PROCEDURE — 1036F TOBACCO NON-USER: CPT | Performed by: NURSE PRACTITIONER

## 2025-03-25 PROCEDURE — 4010F ACE/ARB THERAPY RXD/TAKEN: CPT | Performed by: NURSE PRACTITIONER

## 2025-03-25 PROCEDURE — 1159F MED LIST DOCD IN RCRD: CPT | Performed by: NURSE PRACTITIONER

## 2025-03-25 PROCEDURE — 99214 OFFICE O/P EST MOD 30 MIN: CPT | Performed by: NURSE PRACTITIONER

## 2025-03-25 PROCEDURE — 3044F HG A1C LEVEL LT 7.0%: CPT | Performed by: NURSE PRACTITIONER

## 2025-03-25 RX ORDER — CLOBETASOL PROPIONATE 0.5 MG/G
CREAM TOPICAL 2 TIMES DAILY
Qty: 60 G | Refills: 3 | Status: SHIPPED | OUTPATIENT
Start: 2025-03-25 | End: 2025-04-08

## 2025-03-25 NOTE — PROGRESS NOTES
Subjective     Carmen Mendez is a 70 y.o. female who presents for the following: Skin Check.   Established patient in for yearly full body skin exam.      Review of Systems:  No other skin or systemic complaints other than what is documented elsewhere in the note.    The following portions of the chart were reviewed this encounter and updated as appropriate:       Skin Cancer History  Atypical Melanocytic Hyperplasia-Left posterior thigh- 2014   Specialty Problems          Dermatology Problems    Hemangioma of skin and subcutaneous tissue    Melanocytic nevi of trunk    Nonscarring hair loss, unspecified    Other benign neoplasm of skin of right upper limb, including shoulder    Other melanin hyperpigmentation    Personal history of other malignant neoplasm of skin    Seborrheic dermatitis, unspecified    Solar keratosis    Telogen effluvium    Xerosis cutis    Multiple nevi    Contusion of right knee     Past Medical History:  Carmen Mendez  has a past medical history of Actinic keratosis (10/17/2014), Asymptomatic menopausal state (04/16/2022), Displaced spiral fracture of shaft of humerus, left arm, sequela (08/10/2022), Essential (primary) hypertension (11/16/2013), Essential (primary) hypertension (10/24/2022), Exudative age-related macular degeneration, left eye, stage unspecified (12/10/2019), History of bad fall, History of ear infections as a child, History of hearing loss, History of tonsillectomy, Melanocytic nevi, unspecified (09/20/2019), Osteoarthritis of knee, unspecified (12/10/2018), Other conditions influencing health status (03/17/2014), Personal history of coronary artery disease, Personal history of other diseases of the musculoskeletal system and connective tissue, Personal history of other diseases of the nervous system and sense organs, Personal history of other endocrine, nutritional and metabolic disease, Personal history of other specified conditions (09/17/2015), Pure  hypercholesterolemia, unspecified, Type 2 diabetes mellitus with diabetic polyneuropathy (2021), Type 2 diabetes mellitus without complications (Multi) (10/14/2022), Type 2 diabetes mellitus without complications (Multi) (2022), Unspecified macular degeneration (2019), and Unspecified mononeuropathy of unspecified lower limb (2021).    Past Surgical History:  Carmen Mendez  has a past surgical history that includes Other surgical history (10/01/2019); Other surgical history (10/01/2019); Other surgical history (2019); Other surgical history (2019); Other surgical history (2019);  section, classic (2014); Knee surgery (2014); Carpal tunnel release (2014); and Cataract extraction (Bilateral).    Family History:  Patient family history includes Asthma in her son; Autoimmune disease in her daughter; CVA in her father and mother; Diabetes in her mother; Other in an other family member; sinus disorder in her daughter.    Social History:  Carmen Mendez  reports that she has quit smoking. Her smoking use included cigarettes. She has never used smokeless tobacco. She reports that she does not currently use alcohol. She reports that she does not use drugs.    Allergies:  Hydrocodone-acetaminophen, Oxycodone, Tramadol, Cefdinir, Codeine, Thiopental, Clindamycin, and Sutures    Current Medications / CAM's:    Current Outpatient Medications:     aspirin 81 mg EC tablet, Take 1 tablet (81 mg) by mouth once daily., Disp: , Rfl:     atorvastatin (Lipitor) 20 mg tablet, Take 1 tablet (20 mg) by mouth once daily., Disp: 90 tablet, Rfl: 3    blood sugar diagnostic (OneTouch Verio test strips) strip, 100 strips once daily. Test once daily, Disp: 100 strip, Rfl: 3    glimepiride (Amaryl) 1 mg tablet, Take 1 tablet (1 mg) by mouth once daily in the morning. Take before meals., Disp: 90 tablet, Rfl: 1    hydrocortisone 2.5 % cream, Apply topically 2 times a day. (Patient  taking differently: Apply topically if needed.), Disp: 28 g, Rfl: 1    ketoconazole (NIZOral) 2 % cream, APPLY SPARINGLY TO AFFECTED AREA EVERY DAY, Disp: 60 g, Rfl: 1    lisinopril 20 mg tablet, Take 1 tablet (20 mg) by mouth once daily., Disp: 90 tablet, Rfl: 3    mv-min/FA/vit K/lutein/zeaxant (PRESERVISION AREDS 2 PLUS MV ORAL), Take by mouth., Disp: , Rfl:     Current Facility-Administered Medications:     Tc-99m tetrofosmin (Myoview) injection 10.4 millicurie, 10.4 millicurie, intravenous, Once in imaging, Charly Durand MD    Tc-99m tetrofosmin (Myoview) injection 30.7 millicurie, 30.7 millicurie, intravenous, Once in imaging, Charly Durand MD     Objective   Well appearing patient in no apparent distress; mood and affect are within normal limits.      Assessment/Plan   1. Nevus  Uniform pigmented macule(s)/papule(s) with reassuring findings on dermoscopy    -Discussed nature of condition  -Reassurance, benign-appearing features on examination today  -Recommend continued observation    2. Lentigo  Tan macules    -Benign appearing on exam  -Reassurance, recommend observation    3. Seborrheic keratosis  Stuck on, waxy macule(s)/papule(s)/plaque(s) with comedo-like openings and milia like cysts    -Discussed nature of condition  -Reassurance, recommend continued observation    4. Screening exam for skin cancer    5. History of dysplastic nevus  Left posterior thigh  History of atypical melanotic hyperplasia (2014)    6. Hand dermatitis  Left Hand - Anterior, Right Hand - Anterior  Erythematous plaques    -Discussed nature of diagnosis  -Recommend:    -Discussed with/information given to the patient on the risks, benefits and alternatives of the usage of topical corticosteroids, including but not limited to: atrophy (thinning of the skin), striae (stretch marks), telangiectasia (blood vessel growth), and dyspigmentation (discoloration of the skin).  -Recommend to limit long-term use of topical corticosteroids to less  than 14 days per month to reduce risk of side effects.

## 2025-04-09 ENCOUNTER — OFFICE VISIT (OUTPATIENT)
Dept: URGENT CARE | Age: 70
End: 2025-04-09
Payer: MEDICARE

## 2025-04-09 ENCOUNTER — HOSPITAL ENCOUNTER (OUTPATIENT)
Dept: RADIOLOGY | Facility: CLINIC | Age: 70
Discharge: HOME | End: 2025-04-09
Payer: MEDICARE

## 2025-04-09 VITALS
RESPIRATION RATE: 15 BRPM | OXYGEN SATURATION: 96 % | HEART RATE: 64 BPM | DIASTOLIC BLOOD PRESSURE: 78 MMHG | SYSTOLIC BLOOD PRESSURE: 129 MMHG | TEMPERATURE: 98.1 F

## 2025-04-09 DIAGNOSIS — S69.91XA HAND INJURY, RIGHT, INITIAL ENCOUNTER: ICD-10-CM

## 2025-04-09 DIAGNOSIS — S69.91XA HAND INJURY, RIGHT, INITIAL ENCOUNTER: Primary | ICD-10-CM

## 2025-04-09 PROCEDURE — 3044F HG A1C LEVEL LT 7.0%: CPT | Performed by: NURSE PRACTITIONER

## 2025-04-09 PROCEDURE — 1123F ACP DISCUSS/DSCN MKR DOCD: CPT | Performed by: NURSE PRACTITIONER

## 2025-04-09 PROCEDURE — 1036F TOBACCO NON-USER: CPT | Performed by: NURSE PRACTITIONER

## 2025-04-09 PROCEDURE — 1159F MED LIST DOCD IN RCRD: CPT | Performed by: NURSE PRACTITIONER

## 2025-04-09 PROCEDURE — 73130 X-RAY EXAM OF HAND: CPT | Mod: RT

## 2025-04-09 PROCEDURE — 99213 OFFICE O/P EST LOW 20 MIN: CPT | Performed by: NURSE PRACTITIONER

## 2025-04-09 PROCEDURE — 1160F RVW MEDS BY RX/DR IN RCRD: CPT | Performed by: NURSE PRACTITIONER

## 2025-04-09 PROCEDURE — 73130 X-RAY EXAM OF HAND: CPT | Mod: RIGHT SIDE | Performed by: RADIOLOGY

## 2025-04-09 PROCEDURE — 3074F SYST BP LT 130 MM HG: CPT | Performed by: NURSE PRACTITIONER

## 2025-04-09 PROCEDURE — 3078F DIAST BP <80 MM HG: CPT | Performed by: NURSE PRACTITIONER

## 2025-04-09 PROCEDURE — 4010F ACE/ARB THERAPY RXD/TAKEN: CPT | Performed by: NURSE PRACTITIONER

## 2025-04-09 ASSESSMENT — ENCOUNTER SYMPTOMS: ARTHRALGIAS: 1

## 2025-04-09 NOTE — PROGRESS NOTES
Subjective   Patient ID: Carmen Mendez is a 70 y.o. female. They present today with a chief complaint of Injury (Injured right hand X 1 week ago. JD).    History of Present Illness  Patient presents with concern for right hand injury. States she banged the hand into a cabinet 1-day ago, notes it is swollen and bruised, slight TTP. Endorses no meds for treatment.       Injury      Past Medical History  Allergies as of 04/09/2025 - Reviewed 04/09/2025   Allergen Reaction Noted    Hydrocodone-acetaminophen Hives 04/05/2019    Oxycodone Hives 04/05/2019    Tramadol Hives 04/05/2019    Cefdinir Diarrhea 06/01/2023    Codeine Hives and Nausea/vomiting 07/08/2013    Thiopental Other 04/05/2019    Clindamycin Diarrhea, GI intolerance, and GI Upset 02/28/2024    Sutures Other 04/05/2019       (Not in a hospital admission)       Past Medical History:   Diagnosis Date    Actinic keratosis 10/17/2014    Solar keratosis    Asymptomatic menopausal state 04/16/2022    Menopause    Displaced spiral fracture of shaft of humerus, left arm, sequela 08/10/2022    Closed displaced spiral fracture of shaft of left humerus, sequela    Essential (primary) hypertension 11/16/2013    Benign essential hypertension    Essential (primary) hypertension 10/24/2022    Hypertension    Exudative age-related macular degeneration, left eye, stage unspecified 12/10/2019    Exudative age-related macular degeneration of left eye, unspecified stage    History of bad fall     History of ear infections as a child     History of hearing loss     History of tonsillectomy     Melanocytic nevi, unspecified 09/20/2019    Multiple nevi    Osteoarthritis of knee, unspecified 12/10/2018    Osteoarthritis of knee    Other conditions influencing health status 03/17/2014    Diabetes Mellitus Poorly Controlled    Personal history of coronary artery disease     Personal history of other diseases of the musculoskeletal system and connective tissue     History of  arthritis    Personal history of other diseases of the nervous system and sense organs     History of glaucoma    Personal history of other endocrine, nutritional and metabolic disease     History of type 2 diabetes mellitus    Personal history of other specified conditions 2015    History of headache    Pure hypercholesterolemia, unspecified     High cholesterol    Type 2 diabetes mellitus with diabetic polyneuropathy 2021    Peripheral sensory neuropathy due to type 2 diabetes mellitus    Type 2 diabetes mellitus without complications 10/14/2022    Type 2 diabetes mellitus    Type 2 diabetes mellitus without complications 2022    DM2 (diabetes mellitus, type 2)    Unspecified macular degeneration 2019    Macular degeneration, left eye    Unspecified mononeuropathy of unspecified lower limb 2021    Neuropathy of foot, unspecified laterality       Past Surgical History:   Procedure Laterality Date    CARPAL TUNNEL RELEASE  2014    Neuroplasty Decompression Median Nerve At Carpal Tunnel    CATARACT EXTRACTION Bilateral      SECTION, CLASSIC  2014     Section    KNEE SURGERY  2014    Knee Surgery    OTHER SURGICAL HISTORY  10/01/2019    Femur fracture repair    OTHER SURGICAL HISTORY  10/01/2019    Humerus fracture repair    OTHER SURGICAL HISTORY  2019    Colonoscopy    OTHER SURGICAL HISTORY  2019    Tonsillectomy    OTHER SURGICAL HISTORY  2019    Shoulder surgery        reports that she has quit smoking. Her smoking use included cigarettes. She has never used smokeless tobacco. She reports that she does not currently use alcohol. She reports that she does not use drugs.    Review of Systems  Review of Systems   Musculoskeletal:  Positive for arthralgias.                                  Objective    Vitals:    25 1400   BP: 129/78   Pulse: 64   Resp: 15   Temp: 36.7 °C (98.1 °F)   SpO2: 96%     No LMP recorded. Patient is  postmenopausal.    Physical Exam  Vitals reviewed.   Constitutional:       Appearance: Normal appearance.   Cardiovascular:      Rate and Rhythm: Normal rate.   Pulmonary:      Effort: Pulmonary effort is normal.   Musculoskeletal:         General: Swelling and tenderness present. Normal range of motion.      Comments: Swelling, TTP, bruising along right 2nd metacarpal   Skin:     General: Skin is warm and dry.      Findings: Bruising present.   Neurological:      Mental Status: She is alert.         Procedures    Point of Care Test & Imaging Results from this visit  No results found for this visit on 04/09/25.   Imaging  No results found.    Cardiology, Vascular, and Other Imaging  No other imaging results found for the past 2 days      Diagnostic study results (if any) were reviewed by ROCIO Britt.    Assessment/Plan   Allergies, medications, history, and pertinent labs/EKGs/Imaging reviewed by ROCIO Britt.     Medical Decision Making  Advised no fracture noted on xray. Advised I suspect a contusion based on that and exam. Advised RICE therapy, use an ACE wrap to provide compression and support, gentle ROM activities.     At time of discharge patient was clinically well-appearing and HDS for outpatient management. The patient and/or family was educated regarding diagnosis, supportive care, OTC and Rx medications. The patient and/or family was given the opportunity to ask questions prior to discharge.  They verbalized understanding of my discussion of the plans for treatment, expected course, indications to return to  or seek further evaluation in ED, and the need for timely follow up as directed.   They were provided with a work/school excuse if requested.      Orders and Diagnoses  Diagnoses and all orders for this visit:  Hand injury, right, initial encounter  -     XR hand right 3+ views; Future      Medical Admin Record      Patient disposition: Home    Electronically signed by Mary Alice  RIVKA Eaton-CNP  2:09 PM

## 2025-04-24 DIAGNOSIS — E11.9 TYPE 2 DIABETES MELLITUS WITHOUT COMPLICATION, WITHOUT LONG-TERM CURRENT USE OF INSULIN: ICD-10-CM

## 2025-04-24 RX ORDER — GLIMEPIRIDE 1 MG/1
1 TABLET ORAL
Qty: 90 TABLET | Refills: 1 | Status: SHIPPED | OUTPATIENT
Start: 2025-04-24

## 2025-05-23 ENCOUNTER — DOCUMENTATION WITH CHARGES (OUTPATIENT)
Dept: PHYSICAL THERAPY | Facility: CLINIC | Age: 70
End: 2025-05-23
Payer: COMMERCIAL

## 2025-06-17 ENCOUNTER — APPOINTMENT (OUTPATIENT)
Dept: PRIMARY CARE | Facility: CLINIC | Age: 70
End: 2025-06-17
Payer: MEDICARE

## 2025-06-17 ENCOUNTER — APPOINTMENT (OUTPATIENT)
Dept: PRIMARY CARE | Facility: CLINIC | Age: 70
End: 2025-06-17
Payer: COMMERCIAL

## 2025-06-23 ENCOUNTER — APPOINTMENT (OUTPATIENT)
Dept: PRIMARY CARE | Facility: CLINIC | Age: 70
End: 2025-06-23
Payer: MEDICARE

## 2025-06-23 VITALS
WEIGHT: 184 LBS | HEART RATE: 71 BPM | DIASTOLIC BLOOD PRESSURE: 70 MMHG | SYSTOLIC BLOOD PRESSURE: 118 MMHG | HEIGHT: 67 IN | BODY MASS INDEX: 28.88 KG/M2 | OXYGEN SATURATION: 97 %

## 2025-06-23 DIAGNOSIS — Z71.89 ACP (ADVANCE CARE PLANNING): ICD-10-CM

## 2025-06-23 DIAGNOSIS — E11.42 PERIPHERAL SENSORY NEUROPATHY DUE TO TYPE 2 DIABETES MELLITUS (MULTI): ICD-10-CM

## 2025-06-23 DIAGNOSIS — Z00.00 ROUTINE GENERAL MEDICAL EXAMINATION AT HEALTH CARE FACILITY: Primary | ICD-10-CM

## 2025-06-23 DIAGNOSIS — E11.9 TYPE 2 DIABETES MELLITUS WITHOUT COMPLICATION, WITHOUT LONG-TERM CURRENT USE OF INSULIN: ICD-10-CM

## 2025-06-23 DIAGNOSIS — Z13.31 DEPRESSION SCREEN: ICD-10-CM

## 2025-06-23 DIAGNOSIS — I10 ESSENTIAL (PRIMARY) HYPERTENSION: ICD-10-CM

## 2025-06-23 PROBLEM — E11.40 DIABETIC NEUROPATHY (MULTI): Status: RESOLVED | Noted: 2024-09-30 | Resolved: 2025-06-23

## 2025-06-23 LAB — HBA1C MFR BLD: 5.7 % (ref 4.2–6.5)

## 2025-06-23 PROCEDURE — 3078F DIAST BP <80 MM HG: CPT | Performed by: STUDENT IN AN ORGANIZED HEALTH CARE EDUCATION/TRAINING PROGRAM

## 2025-06-23 PROCEDURE — 1170F FXNL STATUS ASSESSED: CPT | Performed by: STUDENT IN AN ORGANIZED HEALTH CARE EDUCATION/TRAINING PROGRAM

## 2025-06-23 PROCEDURE — 3008F BODY MASS INDEX DOCD: CPT | Performed by: STUDENT IN AN ORGANIZED HEALTH CARE EDUCATION/TRAINING PROGRAM

## 2025-06-23 PROCEDURE — 3074F SYST BP LT 130 MM HG: CPT | Performed by: STUDENT IN AN ORGANIZED HEALTH CARE EDUCATION/TRAINING PROGRAM

## 2025-06-23 PROCEDURE — 3044F HG A1C LEVEL LT 7.0%: CPT | Performed by: STUDENT IN AN ORGANIZED HEALTH CARE EDUCATION/TRAINING PROGRAM

## 2025-06-23 PROCEDURE — 1159F MED LIST DOCD IN RCRD: CPT | Performed by: STUDENT IN AN ORGANIZED HEALTH CARE EDUCATION/TRAINING PROGRAM

## 2025-06-23 PROCEDURE — 4010F ACE/ARB THERAPY RXD/TAKEN: CPT | Performed by: STUDENT IN AN ORGANIZED HEALTH CARE EDUCATION/TRAINING PROGRAM

## 2025-06-23 PROCEDURE — 1036F TOBACCO NON-USER: CPT | Performed by: STUDENT IN AN ORGANIZED HEALTH CARE EDUCATION/TRAINING PROGRAM

## 2025-06-23 PROCEDURE — 99213 OFFICE O/P EST LOW 20 MIN: CPT | Performed by: STUDENT IN AN ORGANIZED HEALTH CARE EDUCATION/TRAINING PROGRAM

## 2025-06-23 PROCEDURE — 83036 HEMOGLOBIN GLYCOSYLATED A1C: CPT | Mod: CLIA WAIVED TEST | Performed by: STUDENT IN AN ORGANIZED HEALTH CARE EDUCATION/TRAINING PROGRAM

## 2025-06-23 PROCEDURE — G0439 PPPS, SUBSEQ VISIT: HCPCS | Performed by: STUDENT IN AN ORGANIZED HEALTH CARE EDUCATION/TRAINING PROGRAM

## 2025-06-23 RX ORDER — BLOOD-GLUCOSE METER
100 EACH MISCELLANEOUS DAILY
Qty: 100 STRIP | Refills: 3 | Status: SHIPPED | OUTPATIENT
Start: 2025-06-23

## 2025-06-23 ASSESSMENT — ACTIVITIES OF DAILY LIVING (ADL)
GROCERY_SHOPPING: INDEPENDENT
DOING_HOUSEWORK: INDEPENDENT
DRESSING: INDEPENDENT
TAKING_MEDICATION: INDEPENDENT
BATHING: INDEPENDENT
MANAGING_FINANCES: INDEPENDENT

## 2025-06-23 ASSESSMENT — ENCOUNTER SYMPTOMS
OCCASIONAL FEELINGS OF UNSTEADINESS: 0
DEPRESSION: 0
LOSS OF SENSATION IN FEET: 0

## 2025-06-23 NOTE — PROGRESS NOTES
"Subjective   Patient ID: Carmen Mendez is a 70 y.o. female who presents for Medicare Annual Wellness Visit Subsequent (Not fasting) and Diabetes.    HPI     Review of Systems    Objective   /70 (BP Location: Right arm, Patient Position: Sitting, BP Cuff Size: Small adult)   Pulse 71   Ht 1.702 m (5' 7\")   Wt 83.5 kg (184 lb)   SpO2 97%   BMI 28.82 kg/m²     Physical Exam    Assessment/Plan          "

## 2025-06-23 NOTE — ASSESSMENT & PLAN NOTE
Orders:    CBC and Auto Differential    Comprehensive Metabolic Panel    Lipid Panel    TSH with reflex to Free T4 if abnormal

## 2025-06-23 NOTE — PROGRESS NOTES
"Subjective   Reason for Visit: Carmen Mendez is an 70 y.o. female here for a Medicare Wellness visit.          Reviewed all medications by prescribing practitioner or clinical pharmacist (such as prescriptions, OTCs, herbal therapies and supplements) and documented in the medical record.    HPI    DM: well controlled, lost about 50 lbs was at 12 in past and now in 5.7. only on amaryl, takes 1 mg BID. Has some neuropathy, and eye issues but sees an eye doctor. Was off glimperide for a little bit but sugars went up     HTN: stable  on ac no lh or dizziness     HLD: stable     Sochx; denies smoking or drinking    Patient Care Team:  Hardeep Green DO as PCP - General (Internal Medicine)  Hardeep Green DO as PCP - [facility] Attributed Provider  Eliseo Chacon MD as Consulting Physician (Cardiology)     Review of Systems   All other systems reviewed and are negative.      Objective   Vitals:  /70 (BP Location: Right arm, Patient Position: Sitting, BP Cuff Size: Small adult)   Pulse 71   Ht 1.702 m (5' 7\")   Wt 83.5 kg (184 lb)   SpO2 97%   BMI 28.82 kg/m²       Physical Exam  Constitutional:       Appearance: Normal appearance.   HENT:      Head: Normocephalic and atraumatic.      Right Ear: Tympanic membrane and ear canal normal.      Left Ear: Tympanic membrane and ear canal normal.      Mouth/Throat:      Mouth: Mucous membranes are moist.      Pharynx: Oropharynx is clear.   Eyes:      Extraocular Movements: Extraocular movements intact.      Conjunctiva/sclera: Conjunctivae normal.      Pupils: Pupils are equal, round, and reactive to light.   Cardiovascular:      Rate and Rhythm: Normal rate and regular rhythm.      Pulses: Normal pulses.      Heart sounds: Normal heart sounds.   Pulmonary:      Effort: Pulmonary effort is normal.      Breath sounds: Normal breath sounds.   Abdominal:      General: Abdomen is flat. Bowel sounds are normal.      Palpations: Abdomen is soft.   Musculoskeletal:         General: " Normal range of motion.      Cervical back: Normal range of motion and neck supple.   Skin:     General: Skin is warm and dry.      Capillary Refill: Capillary refill takes 2 to 3 seconds.   Neurological:      General: No focal deficit present.      Mental Status: She is alert and oriented to person, place, and time. Mental status is at baseline.   Psychiatric:         Mood and Affect: Mood normal.         Behavior: Behavior normal.         Thought Content: Thought content normal.         Judgment: Judgment normal.       Assessment & Plan  Type 2 diabetes mellitus without complication, without long-term current use of insulin    Orders:  •  POCT Glycosylated Hemoglobin (HGB A1C) docked device  •  blood sugar diagnostic (OneTouch Verio test strips); 100 strips once daily. 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 Type 2 diabetes mellitus without complication, without long-term current use of insulin  Doing well no lows  - POCT Glycosylated Hemoglobin (HGB A1C) docked device  - blood sugar diagnostic (OneTouch Verio test strips); 100 strips once daily. Test once daily  Dispense: 100 strip; Refill: 3    2. Routine general medical examination at health care facility (Primary)    - 1 Year Follow Up In Primary Care - Wellness Exam; Future    3. 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 Essential (primary) hypertension    - CBC and Auto Differential  - Comprehensive Metabolic Panel  - Lipid Panel  - TSH with reflex to Free T4 if abnormal    5. Depression  screen  denies    6. ACP (advance care planning)  Hcpoa  Living will  Full code    Tobacco/Alcohol/Opioid use, as well as Illicit Drug Use was screened for/reviewed and documented in Social Documentation section of the chart and medication list as appropriate    Depression Screening  Depression screening completed using the PHQ-2 questions, with results documented in the chart/encounter (~15min).  (See Rooming Screening section for documentation, or progress note for additional information)    Cardiac Risk Assessment  The 10-year ASCVD risk score (Martinez MANZANO, et al., 2019) is: 19.6%    Values used to calculate the score:      Age: 70 years      Sex: Female      Is Non- : No      Diabetic: Yes      Tobacco smoker: No      Systolic Blood Pressure: 118 mmHg      Is BP treated: Yes      HDL Cholesterol: 85.1 mg/dL      Total Cholesterol: 161 mg/dL  Cardiovascular risk was discussed and, if needed, lifestyle modifications recommended, including nutritional choices, exercise, and elimination of habits contributing to risk. We agreed on a plan to reduce the current cardiovascular risk based on above discussion as needed.     Aspirin use/disuse was discussed and documented in the Problem List of the medical record (under Cardiac Risk Counseling) after reviewing the updated guidelines below:  Consider low dose Aspirin ( mg) use if the benefit for cardiovascular disease prevention outweighs risk for bleeding complications.   In general, low dose ASA should be considered:  In patients WITHOUT prior MI/stroke/PAD (primary prevention):   a. Age <60: Use if 10-year cardiovascular disease risk >20%, with discussion of risks and benefits with patient  b. Age 60-<70: Use if 10-year cardiovascular disease risk >20% and low bleeding (e.g., gastrointestinal) risk, with discussion of risks and benefits with patient  c. Age >=70: Do not use    In patients WITH prior MI/stroke/PAD (secondary prevention):    Generally use unless extremely high bleeding (e.g., gastrointenstinal) risk, with discussion of risks and benefits with patient    Advance Directives Discussion  Advanced Care Planning (including a Living Will, Healthcare POA, as well as specific end of life choices and/or directives), was discussed with the patient and/or surrogate, voluntarily, and details of that discussion documented in the Problem List (under Advanced Directives Discussion) of the medical record.    (~16 min spent discussing above)     During the course of the visit the patient was educated and counseled about age appropriate screening and preventive services.   Completed preventive screenings were documented in the chart (see Routine Health Maintenance in Problem List) and orders were placed for outstanding screenings/procedures as documented in the Assessment and Plan.

## 2025-10-22 ENCOUNTER — APPOINTMENT (OUTPATIENT)
Dept: CARDIOLOGY | Facility: CLINIC | Age: 70
End: 2025-10-22
Payer: COMMERCIAL

## 2025-12-22 ENCOUNTER — APPOINTMENT (OUTPATIENT)
Dept: PRIMARY CARE | Facility: CLINIC | Age: 70
End: 2025-12-22
Payer: COMMERCIAL

## 2026-04-07 ENCOUNTER — APPOINTMENT (OUTPATIENT)
Dept: DERMATOLOGY | Facility: CLINIC | Age: 71
End: 2026-04-07
Payer: COMMERCIAL